# Patient Record
Sex: FEMALE | Race: BLACK OR AFRICAN AMERICAN | Employment: UNEMPLOYED | URBAN - METROPOLITAN AREA
[De-identification: names, ages, dates, MRNs, and addresses within clinical notes are randomized per-mention and may not be internally consistent; named-entity substitution may affect disease eponyms.]

---

## 2018-12-24 ENCOUNTER — APPOINTMENT (OUTPATIENT)
Dept: CT IMAGING | Age: 63
DRG: 175 | End: 2018-12-24

## 2018-12-24 ENCOUNTER — HOSPITAL ENCOUNTER (INPATIENT)
Age: 63
LOS: 4 days | Discharge: HOME | DRG: 175 | End: 2018-12-28
Attending: EMERGENCY MEDICINE | Admitting: INTERNAL MEDICINE

## 2018-12-24 DIAGNOSIS — J18.9 PNEUMONIA DUE TO ORGANISM: Primary | ICD-10-CM

## 2018-12-24 DIAGNOSIS — J96.01 ACUTE RESPIRATORY FAILURE WITH HYPOXIA (HCC): ICD-10-CM

## 2018-12-24 DIAGNOSIS — A41.9 SEVERE SEPSIS (HCC): ICD-10-CM

## 2018-12-24 DIAGNOSIS — I26.09 COR PULMONALE, ACUTE (HCC): ICD-10-CM

## 2018-12-24 DIAGNOSIS — I26.99 BILATERAL PULMONARY EMBOLISM (HCC): ICD-10-CM

## 2018-12-24 DIAGNOSIS — R65.20 SEVERE SEPSIS (HCC): ICD-10-CM

## 2018-12-24 LAB
A/G RATIO: 1 (ref 1.1–2.2)
ALBUMIN SERPL-MCNC: 4.3 G/DL (ref 3.4–5)
ALP BLD-CCNC: 75 U/L (ref 40–129)
ALT SERPL-CCNC: 15 U/L (ref 10–40)
ANION GAP SERPL CALCULATED.3IONS-SCNC: 13 MMOL/L (ref 3–16)
APTT: 33.1 SEC (ref 26–36)
AST SERPL-CCNC: 21 U/L (ref 15–37)
BASE EXCESS ARTERIAL: 0.2 MMOL/L (ref -3–3)
BASOPHILS ABSOLUTE: 0 K/UL (ref 0–0.2)
BASOPHILS RELATIVE PERCENT: 0.5 %
BILIRUB SERPL-MCNC: 0.3 MG/DL (ref 0–1)
BILIRUBIN URINE: NEGATIVE
BLOOD, URINE: ABNORMAL
BUN BLDV-MCNC: 21 MG/DL (ref 7–20)
CALCIUM SERPL-MCNC: 10.2 MG/DL (ref 8.3–10.6)
CARBOXYHEMOGLOBIN ARTERIAL: 0.6 % (ref 0–1.5)
CHLORIDE BLD-SCNC: 103 MMOL/L (ref 99–110)
CLARITY: CLEAR
CO2: 26 MMOL/L (ref 21–32)
COLOR: YELLOW
CREAT SERPL-MCNC: 1.1 MG/DL (ref 0.6–1.2)
EOSINOPHILS ABSOLUTE: 0.2 K/UL (ref 0–0.6)
EOSINOPHILS RELATIVE PERCENT: 3.4 %
EPITHELIAL CELLS, UA: 0 /HPF (ref 0–5)
GFR AFRICAN AMERICAN: >60
GFR NON-AFRICAN AMERICAN: 50
GLOBULIN: 4.2 G/DL
GLUCOSE BLD-MCNC: 131 MG/DL (ref 70–99)
GLUCOSE URINE: NEGATIVE MG/DL
HCO3 ARTERIAL: 25.9 MMOL/L (ref 21–29)
HCT VFR BLD CALC: 45.2 % (ref 36–48)
HEMOGLOBIN, ART, EXTENDED: 14.2 G/DL (ref 12–16)
HEMOGLOBIN: 14.8 G/DL (ref 12–16)
HYALINE CASTS: 0 /LPF (ref 0–8)
INR BLD: 1.07 (ref 0.86–1.14)
KETONES, URINE: NEGATIVE MG/DL
LACTIC ACID: 0.9 MMOL/L (ref 0.4–2)
LACTIC ACID: 2.5 MMOL/L (ref 0.4–2)
LEUKOCYTE ESTERASE, URINE: NEGATIVE
LYMPHOCYTES ABSOLUTE: 2.2 K/UL (ref 1–5.1)
LYMPHOCYTES RELATIVE PERCENT: 33.5 %
MCH RBC QN AUTO: 30 PG (ref 26–34)
MCHC RBC AUTO-ENTMCNC: 32.8 G/DL (ref 31–36)
MCV RBC AUTO: 91.6 FL (ref 80–100)
METHEMOGLOBIN ARTERIAL: 0.1 %
MICROSCOPIC EXAMINATION: YES
MONOCYTES ABSOLUTE: 0.3 K/UL (ref 0–1.3)
MONOCYTES RELATIVE PERCENT: 4.4 %
NEUTROPHILS ABSOLUTE: 3.7 K/UL (ref 1.7–7.7)
NEUTROPHILS RELATIVE PERCENT: 58.2 %
NITRITE, URINE: NEGATIVE
O2 CONTENT ARTERIAL: 20 ML/DL
O2 SAT, ARTERIAL: 98.5 %
O2 THERAPY: ABNORMAL
PCO2 ARTERIAL: 44.7 MMHG (ref 35–45)
PDW BLD-RTO: 14.3 % (ref 12.4–15.4)
PH ARTERIAL: 7.37 (ref 7.35–7.45)
PH UA: 5
PLATELET # BLD: 185 K/UL (ref 135–450)
PMV BLD AUTO: 9.6 FL (ref 5–10.5)
PO2 ARTERIAL: 114 MMHG (ref 75–108)
POTASSIUM REFLEX MAGNESIUM: 4.3 MMOL/L (ref 3.5–5.1)
PRO-BNP: 30 PG/ML (ref 0–124)
PROTEIN UA: NEGATIVE MG/DL
PROTHROMBIN TIME: 12.2 SEC (ref 9.8–13)
RBC # BLD: 4.93 M/UL (ref 4–5.2)
RBC UA: 1 /HPF (ref 0–4)
REASON FOR REJECTION: NORMAL
REJECTED TEST: NORMAL
SODIUM BLD-SCNC: 142 MMOL/L (ref 136–145)
SPECIFIC GRAVITY UA: 1.02
TCO2 ARTERIAL: 61 MMOL/L
TOTAL PROTEIN: 8.5 G/DL (ref 6.4–8.2)
TROPONIN: 0.12 NG/ML
URINE REFLEX TO CULTURE: ABNORMAL
URINE TYPE: ABNORMAL
UROBILINOGEN, URINE: 0.2 E.U./DL
WBC # BLD: 6.4 K/UL (ref 4–11)
WBC UA: 0 /HPF (ref 0–5)

## 2018-12-24 PROCEDURE — 81001 URINALYSIS AUTO W/SCOPE: CPT

## 2018-12-24 PROCEDURE — 83880 ASSAY OF NATRIURETIC PEPTIDE: CPT

## 2018-12-24 PROCEDURE — 6360000002 HC RX W HCPCS: Performed by: EMERGENCY MEDICINE

## 2018-12-24 PROCEDURE — 93005 ELECTROCARDIOGRAM TRACING: CPT | Performed by: EMERGENCY MEDICINE

## 2018-12-24 PROCEDURE — 94640 AIRWAY INHALATION TREATMENT: CPT

## 2018-12-24 PROCEDURE — 96375 TX/PRO/DX INJ NEW DRUG ADDON: CPT

## 2018-12-24 PROCEDURE — 85730 THROMBOPLASTIN TIME PARTIAL: CPT

## 2018-12-24 PROCEDURE — 2580000003 HC RX 258: Performed by: EMERGENCY MEDICINE

## 2018-12-24 PROCEDURE — 99285 EMERGENCY DEPT VISIT HI MDM: CPT

## 2018-12-24 PROCEDURE — 85610 PROTHROMBIN TIME: CPT

## 2018-12-24 PROCEDURE — 82803 BLOOD GASES ANY COMBINATION: CPT

## 2018-12-24 PROCEDURE — 85025 COMPLETE CBC W/AUTO DIFF WBC: CPT

## 2018-12-24 PROCEDURE — 6360000002 HC RX W HCPCS

## 2018-12-24 PROCEDURE — 36415 COLL VENOUS BLD VENIPUNCTURE: CPT

## 2018-12-24 PROCEDURE — 71260 CT THORAX DX C+: CPT

## 2018-12-24 PROCEDURE — 83605 ASSAY OF LACTIC ACID: CPT

## 2018-12-24 PROCEDURE — 94660 CPAP INITIATION&MGMT: CPT

## 2018-12-24 PROCEDURE — 6360000004 HC RX CONTRAST MEDICATION: Performed by: EMERGENCY MEDICINE

## 2018-12-24 PROCEDURE — 2580000003 HC RX 258: Performed by: INTERNAL MEDICINE

## 2018-12-24 PROCEDURE — 2500000003 HC RX 250 WO HCPCS: Performed by: INTERNAL MEDICINE

## 2018-12-24 PROCEDURE — 6370000000 HC RX 637 (ALT 250 FOR IP): Performed by: EMERGENCY MEDICINE

## 2018-12-24 PROCEDURE — 2000000000 HC ICU R&B

## 2018-12-24 PROCEDURE — 6360000002 HC RX W HCPCS: Performed by: INTERNAL MEDICINE

## 2018-12-24 PROCEDURE — 80053 COMPREHEN METABOLIC PANEL: CPT

## 2018-12-24 PROCEDURE — 87040 BLOOD CULTURE FOR BACTERIA: CPT

## 2018-12-24 PROCEDURE — 96374 THER/PROPH/DIAG INJ IV PUSH: CPT

## 2018-12-24 PROCEDURE — 84484 ASSAY OF TROPONIN QUANT: CPT

## 2018-12-24 RX ORDER — HEPARIN SODIUM 1000 [USP'U]/ML
80 INJECTION, SOLUTION INTRAVENOUS; SUBCUTANEOUS ONCE
Status: COMPLETED | OUTPATIENT
Start: 2018-12-24 | End: 2018-12-24

## 2018-12-24 RX ORDER — HEPARIN SODIUM 1000 [USP'U]/ML
INJECTION, SOLUTION INTRAVENOUS; SUBCUTANEOUS
Status: COMPLETED
Start: 2018-12-24 | End: 2018-12-24

## 2018-12-24 RX ORDER — ONDANSETRON 2 MG/ML
4 INJECTION INTRAMUSCULAR; INTRAVENOUS EVERY 6 HOURS PRN
Status: DISCONTINUED | OUTPATIENT
Start: 2018-12-24 | End: 2018-12-28 | Stop reason: HOSPADM

## 2018-12-24 RX ORDER — SODIUM CHLORIDE 0.9 % (FLUSH) 0.9 %
10 SYRINGE (ML) INJECTION EVERY 12 HOURS SCHEDULED
Status: DISCONTINUED | OUTPATIENT
Start: 2018-12-24 | End: 2018-12-28 | Stop reason: HOSPADM

## 2018-12-24 RX ORDER — IPRATROPIUM BROMIDE AND ALBUTEROL SULFATE 2.5; .5 MG/3ML; MG/3ML
1 SOLUTION RESPIRATORY (INHALATION) ONCE
Status: COMPLETED | OUTPATIENT
Start: 2018-12-24 | End: 2018-12-24

## 2018-12-24 RX ORDER — 0.9 % SODIUM CHLORIDE 0.9 %
3000 INTRAVENOUS SOLUTION INTRAVENOUS ONCE
Status: COMPLETED | OUTPATIENT
Start: 2018-12-24 | End: 2018-12-24

## 2018-12-24 RX ORDER — HEPARIN SODIUM 10000 [USP'U]/100ML
18 INJECTION, SOLUTION INTRAVENOUS CONTINUOUS
Status: DISPENSED | OUTPATIENT
Start: 2018-12-24 | End: 2018-12-27

## 2018-12-24 RX ORDER — HEPARIN SODIUM 1000 [USP'U]/ML
80 INJECTION, SOLUTION INTRAVENOUS; SUBCUTANEOUS PRN
Status: DISCONTINUED | OUTPATIENT
Start: 2018-12-24 | End: 2018-12-27

## 2018-12-24 RX ORDER — SODIUM CHLORIDE 0.9 % (FLUSH) 0.9 %
10 SYRINGE (ML) INJECTION PRN
Status: DISCONTINUED | OUTPATIENT
Start: 2018-12-24 | End: 2018-12-28 | Stop reason: HOSPADM

## 2018-12-24 RX ORDER — HEPARIN SODIUM 1000 [USP'U]/ML
40 INJECTION, SOLUTION INTRAVENOUS; SUBCUTANEOUS PRN
Status: DISCONTINUED | OUTPATIENT
Start: 2018-12-24 | End: 2018-12-27

## 2018-12-24 RX ORDER — LORAZEPAM 2 MG/ML
1 INJECTION INTRAMUSCULAR ONCE
Status: COMPLETED | OUTPATIENT
Start: 2018-12-24 | End: 2018-12-24

## 2018-12-24 RX ADMIN — SODIUM CHLORIDE 3000 ML: 9 INJECTION, SOLUTION INTRAVENOUS at 20:19

## 2018-12-24 RX ADMIN — HEPARIN SODIUM AND DEXTROSE 18 UNITS/KG/HR: 10000; 5 INJECTION INTRAVENOUS at 20:47

## 2018-12-24 RX ADMIN — CEFTRIAXONE SODIUM 1 G: 10 INJECTION, POWDER, FOR SOLUTION INTRAVENOUS at 23:10

## 2018-12-24 RX ADMIN — IOPAMIDOL 75 ML: 755 INJECTION, SOLUTION INTRAVENOUS at 19:33

## 2018-12-24 RX ADMIN — VANCOMYCIN HYDROCHLORIDE 1500 MG: 10 INJECTION, POWDER, LYOPHILIZED, FOR SOLUTION INTRAVENOUS at 21:26

## 2018-12-24 RX ADMIN — IPRATROPIUM BROMIDE AND ALBUTEROL SULFATE 1 AMPULE: .5; 3 SOLUTION RESPIRATORY (INHALATION) at 19:24

## 2018-12-24 RX ADMIN — AZITHROMYCIN MONOHYDRATE 500 MG: 500 INJECTION, POWDER, LYOPHILIZED, FOR SOLUTION INTRAVENOUS at 23:11

## 2018-12-24 RX ADMIN — HEPARIN SODIUM 7980 UNITS: 1000 INJECTION, SOLUTION INTRAVENOUS; SUBCUTANEOUS at 20:43

## 2018-12-24 RX ADMIN — CEFEPIME HYDROCHLORIDE 2 G: 2 INJECTION, POWDER, FOR SOLUTION INTRAVENOUS at 20:54

## 2018-12-24 RX ADMIN — LORAZEPAM 1 MG: 2 INJECTION INTRAMUSCULAR; INTRAVENOUS at 19:42

## 2018-12-24 RX ADMIN — Medication 10 ML: at 23:11

## 2018-12-24 RX ADMIN — HEPARIN SODIUM 7980 UNITS: 1000 INJECTION INTRAVENOUS; SUBCUTANEOUS at 20:43

## 2018-12-24 ASSESSMENT — PAIN SCALES - GENERAL: PAINLEVEL_OUTOF10: 0

## 2018-12-24 NOTE — ED PROVIDER NOTES
[facility] Emergency Department    CHIEF COMPLAINT  Chief Complaint   Patient presents with    Shortness of Breath     pt states she was at work two hours ago and had sudden onset of SOB with lightheadedness. labored breathing, states she couldnt catch her breath and started coughing yellow phlegm and felt weak. pt states her finger tips also came cyanotic. cold to the touch at this time      HISTORY OF PRESENT ILLNESS  Alejandro Dangelo is a 66 y.o. female  who presents to the ED complaining of Acute onset of shortness of breath with palpitations and chest heaviness with labored breathing about 2 hours prior to arrival.  She reports that her left lower shin to get swollen periodically but she has never been evaluated for this. She reports that she had cyanosis in her fingertips of the time her symptoms began. She has also had a cough without hemoptysis. No fevers. She also denies abdominal pain nausea vomiting or diarrhea. No history of blood clots. No recent travel. She does feel a little lightheaded. She has not passed out. No other complaints, modifying factors or associated symptoms. I have reviewed the following from the nursing documentation. History reviewed. No pertinent past medical history. History reviewed. No pertinent surgical history. History reviewed. No pertinent family history. Social History     Social History    Marital status: N/A     Spouse name: N/A    Number of children: N/A    Years of education: N/A     Occupational History    Not on file.      Social History Main Topics    Smoking status: Never Smoker    Smokeless tobacco: Never Used    Alcohol use No    Drug use: No    Sexual activity: Not on file     Other Topics Concern    Not on file     Social History Narrative    No narrative on file     Current Facility-Administered Medications   Medication Dose Route Frequency Provider Last Rate Last Dose    heparin (porcine) injection 7,980 Units 80 Units/kg Intravenous Once Jeff Wall MD        heparin (porcine) injection 7,980 Units  80 Units/kg Intravenous PRN Jeff Wall MD        heparin (porcine) injection 3,990 Units  40 Units/kg Intravenous PRN Jeff Wall MD        heparin 25,000 units in dextrose 5% 250 mL infusion  18 Units/kg/hr Intravenous Continuous Jeff Wall MD        vancomycin (VANCOCIN) 1,500 mg in dextrose 5 % 250 mL IVPB  15 mg/kg Intravenous Once Jeff Wall MD        cefepime (MAXIPIME) 2 g IVPB minibag  2 g Intravenous Once Jeff Wall MD        [facility]) 500 mg in dextrose 5 % 250 mL IVPB  500 mg Intravenous Once Jeff Wall MD        0.9 % sodium chloride IV bolus 3,000 mL  3,000 mL Intravenous Once Jeff Wall MD 6,000 mL/hr at 12/24/18 2019 3,000 mL at 12/24/18 2019     No current outpatient prescriptions on file. No Known Allergies    REVIEW OF SYSTEMS  10 systems reviewed, pertinent positives per HPI otherwise noted to be negative. PHYSICAL EXAM  BP (!) 141/98   Pulse 122   Temp 98.1 °F (36.7 °C)   Resp 24   Ht 5' 7\" (1.702 m)   Wt 220 lb (99.8 kg)   SpO2 100%   BMI 34.46 kg/m²    GENERAL APPEARANCE: Awake and alert. Cooperative. Mild distress. HENT: Normocephalic. Atraumatic. Mucous membranes are dry. NECK: Supple. EYES: PERRL. EOM's grossly intact. HEART/CHEST: Reg rhythm tachycardic. No murmurs. No chest wall tenderness. LUNGS: Respirations shallow rapid labored, mild scattered rhonchi, minimal/no wheezing, diminished throughout, no focal rales. Speaking in partial sentences. ABDOMEN: No tenderness. Soft. Non-distended. No masses. No organomegaly. No guarding or rebound. Normal bowel sounds throughout. MUSCULOSKELETAL: No extremity edema. Compartments soft. No deformity. No tenderness in the extremities. All extremities neurovascularly intact. SKIN: Warm and dry. No acute rashes.    NEUROLOGICAL: Alert and oriented. CN's 2-12 intact. No gross facial drooping. Strength 5/5, sensation intact. 2 plus DTR's in knees bilaterally. Gait normal.  PSYCHIATRIC: Normal mood and affect. LABS  I have reviewed all labs for this visit.    Results for orders placed or performed during the hospital encounter of 12/24/18   CBC auto differential   Result Value Ref Range    WBC 6.4 4.0 - 11.0 K/uL    RBC 4.93 4.00 - 5.20 M/uL    Hemoglobin 14.8 12.0 - 16.0 g/dL    Hematocrit 45.2 36.0 - 48.0 %    MCV 91.6 80.0 - 100.0 fL    MCH 30.0 26.0 - 34.0 pg    MCHC 32.8 31.0 - 36.0 g/dL    RDW 14.3 12.4 - 15.4 %    Platelets 746 263 - 009 K/uL    MPV 9.6 5.0 - 10.5 fL    Neutrophils % 58.2 %    Lymphocytes % 33.5 %    Monocytes % 4.4 %    Eosinophils % 3.4 %    Basophils % 0.5 %    Neutrophils # 3.7 1.7 - 7.7 K/uL    Lymphocytes # 2.2 1.0 - 5.1 K/uL    Monocytes # 0.3 0.0 - 1.3 K/uL    Eosinophils # 0.2 0.0 - 0.6 K/uL    Basophils # 0.0 0.0 - 0.2 K/uL   Comprehensive Metabolic Panel w/ Reflex to MG   Result Value Ref Range    Sodium 142 136 - 145 mmol/L    Potassium reflex Magnesium 4.3 3.5 - 5.1 mmol/L    Chloride 103 99 - 110 mmol/L    CO2 26 21 - 32 mmol/L    Anion Gap 13 3 - 16    Glucose 131 (H) 70 - 99 mg/dL    BUN 21 (H) 7 - 20 mg/dL    CREATININE 1.1 0.6 - 1.2 mg/dL    GFR Non-African American 50 (A) >60    GFR African American >60 >60    Calcium 10.2 8.3 - 10.6 mg/dL    Total Protein 8.5 (H) 6.4 - 8.2 g/dL    Alb 4.3 3.4 - 5.0 g/dL    Albumin/Globulin Ratio 1.0 (L) 1.1 - 2.2    Total Bilirubin 0.3 0.0 - 1.0 mg/dL    Alkaline Phosphatase 75 40 - 129 U/L    ALT 15 10 - 40 U/L    AST 21 15 - 37 U/L    Globulin 4.2 g/dL   Troponin   Result Value Ref Range    Troponin 0.12 (H) <0.01 ng/mL   Brain Natriuretic Peptide   Result Value Ref Range    Pro-BNP 30 0 - 124 pg/mL   Lactic Acid, Plasma   Result Value Ref Range    Lactic Acid 2.5 (H) 0.4 - 2.0 mmol/L   SPECIMEN REJECTION   Result Value Ref Range    Rejected Test pt     Reason for syndrome, pulmonary embolism, COPD/asthma, pneumonia, sepsis, pericardial tamponade, pneumothorax, CHF, thoracic aortic dissection, anxiety    The patient's ED workup was notable for acute bilateral pulmonary emboli with right upper lobe infiltrate, concerning for possible pneumonia although infarction is certainly on the differential as well. She does appear to have some evidence of cor pulmonale with a positive troponin although nonischemic EKG despite tachycardia. Despite her findings which are fairly significant she has a normal/slightly high blood pressure. She is mentating without any issue. She did not improve with nasal cannula oxygen but did improve with BiPAP in terms of work of breathing as well as Ativan for anxiolysis. Broad-spectrum abx were ordered based on elevated lactate with possible infiltrate. I consulted and spoke with Dr. Antonio Pro from vascular surgery about the patient's ED history, physical, workup, and course so far. Recommendations from this consultant included originally systemic tPA in addition to heparin. However after d/w hospitalist who again spoke with vascular surgery, plan was to try high dose heparin first and hold on tPA for now.     SEP-1 CORE MEASURE DATA    Classification: severe sepsis    Amount of fluids ordered: at least 30mL/kg based on entered actual weight at time of triage    Time at which sepsis was identified: 7:30pm    Broad-spectrum antibiotics chosen: vanc/cefepime/azithromycin based on sepsis order-set for a suspected source of: Pulmonary - Nosocomial    Repeat lactate level: pending    During the patient's ED course, the patient was given:  Medications   heparin (porcine) injection 7,980 Units (not administered)   heparin (porcine) injection 7,980 Units (not administered)   heparin (porcine) injection 3,990 Units (not administered)   heparin 25,000 units in dextrose 5% 250 mL infusion (not administered)   vancomycin (VANCOCIN) 1,500 mg in dextrose 5 % 250 mL IVPB (not administered)   cefepime (MAXIPIME) 2 g IVPB minibag (not administered)   azithromycin (ZITHROMAX) 500 mg in dextrose 5 % 250 mL IVPB (not administered)   0.9 % sodium chloride IV bolus 3,000 mL (3,000 mLs Intravenous New Bag 12/24/18 2019)   ipratropium-albuterol (DUONEB) nebulizer solution 1 ampule (1 ampule Inhalation Given 12/24/18 1924)   LORazepam (ATIVAN) injection 1 mg (1 mg Intravenous Given 12/24/18 1942)   iopamidol (ISOVUE-370) 76 % injection 75 mL (75 mLs Intravenous Given 12/24/18 1933)        CLINICAL IMPRESSION  1. Pneumonia due to organism    2. Severe sepsis ([address]    3. Acute respiratory failure with hypoxia (HCC)    4. Bilateral pulmonary embolism ([address]    5. Cor pulmonale, acute (HCC)        Blood pressure (!) 141/98, pulse 122, temperature 98.1 °F (36.7 °C), resp. rate 24, height 5' 7\" (1.702 m), weight 220 lb (99.8 kg), SpO2 100 %. DISPOSITION  [facility] was admitted in fair condition. I have discussed the findings of today's workup with the patient and addressed the patient's questions and concerns. The plan is to admit to the hospital at this time under the hospitalist service. I spoke with Dr. Cristal Caruso who accepted the patient and will take over the patient's care. The patient is agreeable with this plan. The total critical care time spent while evaluating and treating this patient was at least 90 minutes. This excludes time spent doing separately billable procedures. This includes time at the bedside, data interpretation, medication management, obtaining critical history from collateral sources if the patient is unable to provide it directly, and physician consultation. Specifics of interventions taken and potentially life-threatening diagnostic considerations are listed above in the medical decision making. DISCLAIMER: This chart was created using Dragon dictation software.   Efforts were made by me to ensure accuracy, however some errors may be present due to limitations of this technology and occasionally words are not transcribed correctly.        Gustavo Arnold MD  12/24/18 2040

## 2018-12-25 PROBLEM — J18.9 PNEUMONIA OF RIGHT UPPER LOBE DUE TO INFECTIOUS ORGANISM: Status: ACTIVE | Noted: 2018-12-25

## 2018-12-25 PROBLEM — J96.01 ACUTE RESPIRATORY FAILURE WITH HYPOXIA (HCC): Status: ACTIVE | Noted: 2018-12-25

## 2018-12-25 LAB
APTT: 149.9 SEC (ref 26–36)
APTT: 187 SEC (ref 26–36)
APTT: 33.4 SEC (ref 26–36)
APTT: >248 SEC (ref 26–36)
EKG ATRIAL RATE: 124 BPM
EKG DIAGNOSIS: NORMAL
EKG P AXIS: 56 DEGREES
EKG P-R INTERVAL: 148 MS
EKG Q-T INTERVAL: 294 MS
EKG QRS DURATION: 80 MS
EKG QTC CALCULATION (BAZETT): 422 MS
EKG R AXIS: 44 DEGREES
EKG T AXIS: 41 DEGREES
EKG VENTRICULAR RATE: 124 BPM
LEFT VENTRICULAR EJECTION FRACTION HIGH VALUE: 65 %
LEFT VENTRICULAR EJECTION FRACTION MODE: NORMAL
LV EF: 65 %
LVEF MODALITY: NORMAL
TROPONIN: 0.12 NG/ML
TROPONIN: 0.2 NG/ML

## 2018-12-25 PROCEDURE — 99255 IP/OBS CONSLTJ NEW/EST HI 80: CPT | Performed by: INTERNAL MEDICINE

## 2018-12-25 PROCEDURE — 6360000002 HC RX W HCPCS: Performed by: INTERNAL MEDICINE

## 2018-12-25 PROCEDURE — 85730 THROMBOPLASTIN TIME PARTIAL: CPT

## 2018-12-25 PROCEDURE — 94660 CPAP INITIATION&MGMT: CPT

## 2018-12-25 PROCEDURE — 94762 N-INVAS EAR/PLS OXIMTRY CONT: CPT

## 2018-12-25 PROCEDURE — 84484 ASSAY OF TROPONIN QUANT: CPT

## 2018-12-25 PROCEDURE — 2500000003 HC RX 250 WO HCPCS: Performed by: INTERNAL MEDICINE

## 2018-12-25 PROCEDURE — 93010 ELECTROCARDIOGRAM REPORT: CPT | Performed by: INTERNAL MEDICINE

## 2018-12-25 PROCEDURE — 99254 IP/OBS CNSLTJ NEW/EST MOD 60: CPT | Performed by: SURGERY

## 2018-12-25 PROCEDURE — 36415 COLL VENOUS BLD VENIPUNCTURE: CPT

## 2018-12-25 PROCEDURE — 93306 TTE W/DOPPLER COMPLETE: CPT

## 2018-12-25 PROCEDURE — 2700000000 HC OXYGEN THERAPY PER DAY

## 2018-12-25 PROCEDURE — 6360000002 HC RX W HCPCS: Performed by: EMERGENCY MEDICINE

## 2018-12-25 PROCEDURE — 2000000000 HC ICU R&B

## 2018-12-25 RX ORDER — AZITHROMYCIN 250 MG/1
250 TABLET, FILM COATED ORAL DAILY
Status: DISCONTINUED | OUTPATIENT
Start: 2018-12-26 | End: 2018-12-28 | Stop reason: HOSPADM

## 2018-12-25 RX ADMIN — HEPARIN SODIUM AND DEXTROSE 10 UNITS/KG/HR: 10000; 5 INJECTION INTRAVENOUS at 19:02

## 2018-12-25 RX ADMIN — HEPARIN SODIUM 7980 UNITS: 1000 INJECTION INTRAVENOUS; SUBCUTANEOUS at 19:03

## 2018-12-25 RX ADMIN — CEFTRIAXONE SODIUM 1 G: 10 INJECTION, POWDER, FOR SOLUTION INTRAVENOUS at 22:41

## 2018-12-25 ASSESSMENT — PAIN SCALES - GENERAL
PAINLEVEL_OUTOF10: 0

## 2018-12-25 NOTE — CONSULTS
Alert, cooperative, no distress, appears stated age   Head:  Normocephalic, without obvious abnormality, atraumatic   Eyes:  PERRL, conjunctiva/corneas clear       Nose: Nares normal, no drainage or sinus tenderness   Throat: Lips, mucosa, and tongue normal   Neck: Supple, symmetrical, trachea midline, no adenopathy, thyroid: not enlarged, symmetric, no tenderness/mass/nodules, no carotid bruit or JVD       Lungs:   Clear to auscultation bilaterally, respirations unlabored   Chest Wall:  No tenderness or deformity   Heart:  Regular rate and rhythm, S1, S2 normal, no murmur, rub or gallop   Abdomen:   Soft, non-tender, bowel sounds active all four quadrants,  no masses, no organomegaly           Extremities: Extremities normal, atraumatic, no cyanosis, left leg 2 + nonpitting edema   Pulses: 2+ and symmetric   Skin: Skin color, texture, turgor normal, no rashes or lesions   Pysch: Normal mood and affect   Neurologic: Normal gross motor and sensory exam.         Labs  CBC:   Lab Results   Component Value Date    WBC 6.4 12/24/2018    RBC 4.93 12/24/2018    HGB 14.8 12/24/2018    HCT 45.2 12/24/2018    MCV 91.6 12/24/2018    RDW 14.3 12/24/2018     12/24/2018     CMP:    Lab Results   Component Value Date     12/24/2018    K 4.3 12/24/2018     12/24/2018    CO2 26 12/24/2018    BUN 21 12/24/2018    CREATININE 1.1 12/24/2018    GFRAA >60 12/24/2018    AGRATIO 1.0 12/24/2018    LABGLOM 50 12/24/2018    GLUCOSE 131 12/24/2018    PROT 8.5 12/24/2018    CALCIUM 10.2 12/24/2018    BILITOT 0.3 12/24/2018    ALKPHOS 75 12/24/2018    AST 21 12/24/2018    ALT 15 12/24/2018     PT/INR:  No results found for: PTINR  Lab Results   Component Value Date    TROPONINI 0.12 (H) 12/25/2018     Trop 0.20 -> 0.12  Echo preliminary Right ventricle is not enlarged. CT chest is not the best test to assess RV size.  It is the echo or MRI  RV function on initial eval appears ok but will put final report as soon images are down

## 2018-12-25 NOTE — H&P
[facility]                     [facility], [address]                              HISTORY AND PHYSICAL    PATIENT NAME: Destinee Herbert                       :        1955  MED REC NO:   [id number]                          ROOM:       6684  ACCOUNT NO:   [de-identified]                           ADMIT DATE: 2018  PROVIDER:     Blaire Shaw MD    I obtained the history and performed the physical exam on the patient on  the medical floor in the emergency room on 2018. CHIEF COMPLAINT:  Shortness of breath. HISTORY OF PRESENT ILLNESS:  The patient is a 72-year-old  -American female, presented to the hospital with chief complaints  of one-day history of sudden onset of severe shortness of breath along  with extreme lightheadedness, difficulty in breathing, coughing, and  weakness that caused her to have to come to the emergency room. Upon  the ER documentation, the patient also was noted to have bluish  discoloration of her fingertips. PAST MEDICAL/PAST SURGICAL HISTORY:  No major medical problems in the  past.    ALLERGIC HISTORY:  No known allergies. FAMILY HISTORY:  Reviewed by me and is currently noncontributory. SOCIAL HISTORY:  Nonsmoker. No illicit substance use. MEDICATIONS:  The patient's home medication list has been reviewed. REVIEW OF SYSTEMS:  The patient's review of systems is significant for  the sudden onset of shortness of breath and the dizziness, and per the  history of present illness. All other systems have been reviewed and  are negative except for the present illness. PHYSICAL EXAMINATION:  GENERAL:  The patient is examined by me in the emergency room. VITAL SIGNS:  Temperature 98.1, respiratory rate is 18, pulse initially  128, blood pressure 141/98, saturating initially 76% on room air,  improved to 97% on 40% FiO2. CNS:  Alert, awake, and oriented to time, place, and person.   PSYCH: Cooperative, pleasant, answering questions appropriately. Does  appear significantly anxious. EYES:  Pupils are reactive to light. ENT:  Extraocular muscle movements are intact. The patient does have  significant nasal flaring. RESPIRATORY SYSTEM:  No rales, rubs, or rhonchi. CVS:  S1 and S2 are heard. No murmurs or rubs. ABDOMEN:  Soft. No guarding, rigidity, or rebound. MUSCULOSKELETAL:  No acute deformities. The patient has got left lower  extremity significantly is more swollen than the right with pitting  edema. SKIN:  No rashes or lesions. DIAGNOSTIC DATA:  CT chest, PE protocol, independently reviewed by me  does show significant pulmonary emboli specially on the right side, what  appears to be pneumonia per the radiologist actually appears to be more  a lung infarction because of the significant pulmonary embolism. The radiologist has interpreted the RV to be mildly dilated. ABG shows a pH of 7.37, pCO2 of 44.7. Comprehensive metabolic panel  showed glucose 131, BUN 29, and creatinine 1.1. Troponin 0.12, lactic  acid 2.5. CBC showed white count of 6.4.    CONSULTATIONS:  I discussed the case independently with the vascular  surgeon over the phone and I expressed to her my concern with respect to  the initial plan of giving TPA given the lack of hemodynamic instability  at this point in time, and she recommended continuing high dose IV  heparin instead which I agree with. ASSESSMENT:  1. Acute bilateral pulmonary embolisms with lung infarction, suggestive  of a submassive pulmonary embolism as evidenced by RV dysfunction and  slight myocardial strain/necrosis. 2.  Obesity with a BMI of 36.4 kg/m2. PLAN OF CARE:  The patient has been admitted to the Internal Medicine  Service. She is currently critically ill given the large size of the  pulmonary embolism.   At this point in time, we are avoiding giving her  TPA given the fact that the patient's blood pressure is stable and

## 2018-12-25 NOTE — PROGRESS NOTES
H/p dictation id [id number]. Date of service 12/24/2018. Submassive PE with lung infarction. RV strain. Obesity.

## 2018-12-25 NOTE — PROGRESS NOTES
Received call from lab results of APTT which was drawn at 0810 in error. Heparin drip had been off per protocol and timing of lab test changed. Most recent APTT which was drawn at 1031 currently running and will call those results.

## 2018-12-25 NOTE — PROGRESS NOTES
Dr Mariel Hallman here for rounds. Reviewed chart, examined and discussed patient. Will continue with heparin drip. Would like echocardiogram and doppler studies of lower legs today. Call placed to echo tech & vascular tech who are both on call. Call returned and spoke to Dr Mariel Hallman who verified that he wanted the echo done today however the doppler could wait until tomorrow. Tech informed us that she would need cardiology consult and the cardiologist would need to call her in. Order placed.

## 2018-12-25 NOTE — PLAN OF CARE
Problem: Pain:  Goal: Pain level will decrease  Pain level will decrease   Outcome: Ongoing    Goal: Control of acute pain  Control of acute pain   Outcome: Ongoing      Problem: Airway Clearance - Ineffective:   Intervention: Assess risk for ineffective airway clearance  BiPAP use ongoing and effective

## 2018-12-25 NOTE — PROGRESS NOTES
Patient awakened for assessment. Arouses easily to name. Alert, oriented and cooperative. On BiPap 12/6 40%. Tolerating well. O2 sat 97%. Respirations non labored at rest.  Chest sounds clear bilaterally with equal air exchange. Denies pain or SOB at this time. Abdomen soft, non tender with BS present x 4. Peripheral pulses present bilaterally. Edema noted both lower extremities with markedly increased edema in left leg. States the swelling is chronic and intermittently has discomfort in the leg. Complete assessment done and recorded. See flow sheet for details.

## 2018-12-25 NOTE — PROGRESS NOTES
last APTT >248. held the heparin gtt per protocol for 1hr and just restarted it at 12u/kg/hr. Next aPTT due at 1005. Noted the troponin trended up from 0.12 to 0.20. Made MD on call aware of both labs. No new orders received. VSS. Patient continues to tolerate BiPAP. O2 sat weaned down from 60% to 40% at this time.  Will continue to monitor

## 2018-12-25 NOTE — PROGRESS NOTES
Bedside report received from off going shift to ensure safe transfer of care. Patient awake without complaints. Will return for complete assessment.

## 2018-12-25 NOTE — PROGRESS NOTES
Patient was transferred from ER to room [id number]. Was on non re breather when arrived and with O2 sat of 87%. Patient was placed on BiPAP and O2 sat went up to 93%. Patient was settled down in the room and oriented to the room and call light use. Lungs with slight rhonchi. BLE +1 edema but noted L>R. Denied pain. Has heparin gtt infusing at 18u/kg/hr and antibiotics infusing also. Bowers draining clear yellow urine. Will continue to monitor.

## 2018-12-25 NOTE — CONSULTS
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 She thinks that she might have been somewhat short of breath for the last few days but never considered it to be a significant issue. No cough, phlegm or chest pain. No fevers. No travel history. Left leg has been swollen for \"years\". No family history or prior history of VTE. Not on hormone replacement therapy. No history of malignancies, never had colonoscopy in the past.       The patient is a 66 y.o. female with significant past medical history of:      Diagnosis Date    Hypertension         Past Surgical History:        Procedure Laterality Date    PATELLA SURGERY Right     TUBAL LIGATION       Current Medications:    Current Facility-Administered Medications: heparin (porcine) injection 7,980 Units, 80 Units/kg, Intravenous, PRN  heparin (porcine) injection 3,990 Units, 40 Units/kg, Intravenous, PRN  heparin 25,000 units in dextrose 5% 250 mL infusion, 18 Units/kg/hr, Intravenous, Continuous  sodium chloride flush 0.9 % injection 10 mL, 10 mL, Intravenous, 2 times per day  sodium chloride flush 0.9 % injection 10 mL, 10 mL, Intravenous, PRN  ondansetron (ZOFRAN) injection 4 mg, 4 mg, Intravenous, Q6H PRN  cefTRIAXone (ROCEPHIN) 1 g in sterile water 10 mL IV syringe, 1 g, Intravenous, Q24H  azithromycin (ZITHROMAX) 500 mg in dextrose 5 % 250 mL IVPB, 500 mg, Intravenous, Q24H    No Known Allergies    Social History:    TOBACCO:   reports that she quit smoking about 5 years ago. She has a 60.00 pack-year smoking history. She has never used smokeless tobacco.  ETOH:   reports that she does not drink alcohol.   Patient currently lives independently    Family History:   Family History   Problem Relation Age of Onset    Heart Disease Father     Heart Disease Paternal Aunt     Heart Disease Paternal Uncle     Diabetes Paternal Grandmother        REVIEW OF SYSTEMS:    Constitutional: negative for fatigue, fevers, malaise and weight loss  Ears, nose, mouth, throat: negative for ear drainage, epistaxis, hoarseness, nasal congestion, sore throat and voice change  Respiratory: negative except for shortness of breath  Cardiovascular: negative for chest pain, chest pressure/discomfort, irregular heart beat, lower extremity edema and palpitations  Gastrointestinal: negative for abdominal pain, constipation, diarrhea, jaundice, melena, odynophagia, reflux symptoms and vomiting  Hematologic/lymphatic: negative for bleeding, easy bruising, lymphadenopathy and petechiae  Musculoskeletal:negative for arthralgias, bone pain, muscle weakness, neck pain and stiff joints  Neurological: negative for dizziness, gait problems, headaches, seizures, speech problems, tremors and weakness  Behavioral/Psych: negative for anxiety, behavior problems, depression, fatigue and sleep disturbance  Endocrine: negative for diabetic symptoms including none, neuropathy, polyphagia, polyuria, polydipsia, vomiting and diarrhea and temperature intolerance  Allergic/Immunologic: negative for anaphylaxis, angioedema, hay fever and urticaria      Objective:   Patient Vitals for the past 8 hrs:   BP Temp Temp src Pulse Resp SpO2 Weight   12/25/18 0800 - - Temporal - - - -   12/25/18 0730 - - - - 18 97 % -   12/25/18 0600 138/76 - - 89 18 96 % -   12/25/18 0414 - - - - 19 97 % -   12/25/18 0400 132/83 98.7 °F (37.1 °C) Temporal 95 22 97 % 231 lb 0.7 oz (104.8 kg)   12/25/18 0300 138/81 - - 91 - 98 % -   12/25/18 0200 (!) 129/95 - - 95 20 98 % -   12/25/18 0100 128/81 - - 99 22 94 % -     I/O last 3 completed shifts:   In: 4131 [I.V.:2495; IV Piggyback:250]  Out: 2650 [Urine:2650]  No intake/output data recorded. Physical Exam:  General Appearance: alert and oriented to person, place and time, well developed and well- nourished, in no acute distress  Skin: warm and dry, no rash or erythema  Head: normocephalic and atraumatic  Eyes: pupils equal, round, and reactive to light, extraocular eye movements intact, conjunctivae normal  ENT: external ear and ear canal normal bilaterally, nose without deformity, nasal mucosa and turbinates normal  Neck: supple and non-tender without mass, no cervical lymphadenopathy  Pulmonary/Chest: clear to auscultation bilaterally- no wheezes, rales or rhonchi, normal air movement, no respiratory distress  Cardiovascular: normal rate, regular rhythm,  no murmurs, rubs, distal pulses intact, no carotid bruits  Abdomen: soft, non-tender, non-distended, normal bowel sounds, no masses or organomegaly  Lymph Nodes: Cervical, supraclavicular normal  Extremities: no cyanosis, clubbing or edema  Musculoskeletal: normal range of motion, no joint swelling, deformity or tenderness  Neurologic: alert, no focal neurologic deficits    Data Review:  CBC: Lab Results   Component Value Date    WBC 6.4 12/24/2018    RBC 4.93 12/24/2018     BMP: Lab Results   Component Value Date    GLUCOSE 131 12/24/2018    CO2 26 12/24/2018    BUN 21 12/24/2018    CREATININE 1.1 12/24/2018    CALCIUM 10.2 12/24/2018     ABG: Lab Results   Component Value Date    QGK5JPL 25.9 12/24/2018    BEART 0.2 12/24/2018    P5KLKXIU 98.5 12/24/2018    PHART 7.371 12/24/2018    JQJ6XOV 44.7 12/24/2018    PO2ART 114.0 12/24/2018    HDX0OTD 61.0 12/24/2018       Radiology: All pertinent images / reports were reviewed as a part of this visit. Problem List:   Bilateral PE with acute cor pulmonale  Acute hypoxic respiratory failure  Pulmonary infarct    Assessment/Plan:     Reviewed patient's CT scan which shows significant clot burden bilaterally with RV dilation. Suspect acute cor pulmonale.   Will await for

## 2018-12-26 LAB
ANION GAP SERPL CALCULATED.3IONS-SCNC: 10 MMOL/L (ref 3–16)
APTT: 160.7 SEC (ref 26–36)
APTT: 36.8 SEC (ref 26–36)
APTT: 47.8 SEC (ref 26–36)
APTT: 77.4 SEC (ref 26–36)
BUN BLDV-MCNC: 12 MG/DL (ref 7–20)
CALCIUM SERPL-MCNC: 9.1 MG/DL (ref 8.3–10.6)
CHLORIDE BLD-SCNC: 107 MMOL/L (ref 99–110)
CO2: 24 MMOL/L (ref 21–32)
CREAT SERPL-MCNC: 0.8 MG/DL (ref 0.6–1.2)
GFR AFRICAN AMERICAN: >60
GFR NON-AFRICAN AMERICAN: >60
GLUCOSE BLD-MCNC: 102 MG/DL (ref 70–99)
HCT VFR BLD CALC: 39.2 % (ref 36–48)
HEMOGLOBIN: 12.9 G/DL (ref 12–16)
INR BLD: 1.22 (ref 0.86–1.14)
MCH RBC QN AUTO: 30 PG (ref 26–34)
MCHC RBC AUTO-ENTMCNC: 33 G/DL (ref 31–36)
MCV RBC AUTO: 91 FL (ref 80–100)
PDW BLD-RTO: 14 % (ref 12.4–15.4)
PLATELET # BLD: 143 K/UL (ref 135–450)
PMV BLD AUTO: 10.5 FL (ref 5–10.5)
POTASSIUM SERPL-SCNC: 3.6 MMOL/L (ref 3.5–5.1)
PROTHROMBIN TIME: 13.9 SEC (ref 9.8–13)
RBC # BLD: 4.31 M/UL (ref 4–5.2)
SODIUM BLD-SCNC: 141 MMOL/L (ref 136–145)
WBC # BLD: 6 K/UL (ref 4–11)

## 2018-12-26 PROCEDURE — 36415 COLL VENOUS BLD VENIPUNCTURE: CPT

## 2018-12-26 PROCEDURE — 6360000002 HC RX W HCPCS: Performed by: EMERGENCY MEDICINE

## 2018-12-26 PROCEDURE — 2580000003 HC RX 258: Performed by: INTERNAL MEDICINE

## 2018-12-26 PROCEDURE — 6360000002 HC RX W HCPCS: Performed by: INTERNAL MEDICINE

## 2018-12-26 PROCEDURE — 85610 PROTHROMBIN TIME: CPT

## 2018-12-26 PROCEDURE — 1200000000 HC SEMI PRIVATE

## 2018-12-26 PROCEDURE — 80048 BASIC METABOLIC PNL TOTAL CA: CPT

## 2018-12-26 PROCEDURE — 99233 SBSQ HOSP IP/OBS HIGH 50: CPT | Performed by: INTERNAL MEDICINE

## 2018-12-26 PROCEDURE — 93970 EXTREMITY STUDY: CPT

## 2018-12-26 PROCEDURE — 6370000000 HC RX 637 (ALT 250 FOR IP): Performed by: INTERNAL MEDICINE

## 2018-12-26 PROCEDURE — 85730 THROMBOPLASTIN TIME PARTIAL: CPT

## 2018-12-26 PROCEDURE — 2500000003 HC RX 250 WO HCPCS: Performed by: INTERNAL MEDICINE

## 2018-12-26 PROCEDURE — 85027 COMPLETE CBC AUTOMATED: CPT

## 2018-12-26 RX ADMIN — HEPARIN SODIUM 3990 UNITS: 1000 INJECTION INTRAVENOUS; SUBCUTANEOUS at 16:29

## 2018-12-26 RX ADMIN — AZITHROMYCIN 250 MG: 250 TABLET, FILM COATED ORAL at 08:44

## 2018-12-26 RX ADMIN — Medication 10 ML: at 20:03

## 2018-12-26 RX ADMIN — Medication 10 ML: at 08:44

## 2018-12-26 RX ADMIN — HEPARIN SODIUM 3990 UNITS: 1000 INJECTION INTRAVENOUS; SUBCUTANEOUS at 10:03

## 2018-12-26 RX ADMIN — CEFTRIAXONE SODIUM 1 G: 10 INJECTION, POWDER, FOR SOLUTION INTRAVENOUS at 23:24

## 2018-12-26 ASSESSMENT — PAIN SCALES - GENERAL
PAINLEVEL_OUTOF10: 0

## 2018-12-26 NOTE — PLAN OF CARE
Problem: Pain:  Goal: Pain level will decrease  Pain level will decrease   Outcome: Ongoing  Patient denied pain since the start of shift till current time. Problem: Falls - Risk of:  Goal: Will remain free from falls  Will remain free from falls   Outcome: Ongoing  Patient continues on bed rest. Bed alarm in place. Patient has call button close to call for assistance.

## 2018-12-26 NOTE — PROGRESS NOTES
This note also relates to the following rows which could not be included:  Resp - Cannot attach notes to unvalidated device data  SpO2 - Cannot attach notes to unvalidated device data       12/25/18 1910   Oxygen Therapy/Pulse Ox   O2 Therapy Oxygen   O2 Device Nasal cannula  (bipap st-by, bilat.  PE)   O2 Flow Rate (L/min) 4 L/min  (decreased to 3 lpm )   Pulse Oximeter Device Mode Continuous   Pulse Oximeter Device Location Finger

## 2018-12-26 NOTE — PROGRESS NOTES
Shift assessment completed, see flow sheet. Morning medications passes see MAR. A&O X4. Lung sounds are clear in the upper lobes and diminished in the lower lobes. Bowel sounds are active and pt did orders breakfast. Pt does have some swelling in the BLE. Pt has some weakness and pain in the RLE that she states has been going on for about 6 months. Pedal pulses are palpable.  at bedside and updated. Pt to up and out of bed. Pt denies pain any other needs at this time. Will continue to monitor.

## 2018-12-26 NOTE — PROGRESS NOTES
Hospitalist Progress Note      PCP: No primary care provider on file. Date of Admission: 12/24/2018    LOS: 2    Chief Complaint:   Chief Complaint   Patient presents with    Shortness of Breath     pt states she was at work two hours ago and had sudden onset of SOB with lightheadedness. labored breathing, states she couldnt catch her breath and started coughing yellow phlegm and felt weak. pt states her finger tips also came cyanotic. cold to the touch at this time       Case Summary:   63yo male with history of hypertension who presented with acute SOB, dry cough and chills and found to have extensive bilateral pulmonary emboli with mild right ventricular strain complicated by right upper lobe pneumonia       Active Hospital Problems    Diagnosis Date Noted    Pneumonia due to organism [J18.9] 12/25/2018    Acute respiratory failure with hypoxia ([address] [J96.01] 12/25/2018    Bilateral pulmonary embolism (HCC) [I26.99]     Pulmonary embolism and infarction ([address] [I26.99] 12/24/2018           Medications:  Reviewed  Infusion Medications    heparin (porcine) 6 Units/kg/hr (12/26/18 0958)     Scheduled Medications    azithromycin  250 mg Oral Daily    sodium chloride flush  10 mL Intravenous 2 times per day    cefTRIAXone (ROCEPHIN) IV  1 g Intravenous Q24H     PRN Meds: heparin (porcine), heparin (porcine), sodium chloride flush, ondansetron        DVT Prophylaxis: Therapeutic heparin drip   Diet: DIET GENERAL;  Code Status: Full Code    Dispo:  Anticipate discharge in the next 72-96hrs    ____________________________________________________________________________    Subjective:       Pt is sitting in bed she denies any CP or SOB ( at rest) or light headedness        Physical Exam Performed:  BP (!) 164/84   Pulse 87   Temp 99 °F (37.2 °C) (Temporal)   Resp 19   Ht 5' 7\" (1.702 m)   Wt 231 lb 7.7 oz (105 kg)   LMP  (LMP Unknown)   SpO2 93%   BMI 36.26 kg/m²   General appearance: No apparent emboli as detailed above. Mildly dilated   right ventricle compared with the left suggesting right heart strain. 2. Large right upper lobe consolidation compatible with pneumonia. Findings were discussed with Dr. Fabiano Baxter of the [facility] emergency department at 7:52 pm on 12/24/2018. VL Extremity Venous Bilateral    (Results Pending)       Assessment/Plan:  Principal Problem:    Pulmonary embolism and infarction ([address] hemodynamically stable now,  it looks unprovoked, no Hx of cancer, no prolonged sitting or travel no previous hx of PE/DVT, no recent surgery, active life style, no expressed risk of bleeding  -Continue anticoagulation with heparin, ? NOACs  -cardiology saw the pt, they recommend stress test as outpt when test is stable  -ECHO done, and no strain mentioned in report  - vascular evaluated the pt, no indication for EKOS, vascular signed off  - LE venous doppler is ordered   - would recommend [facility] [place] for 6 months and then to re-evaluation as outpt for need of indefinite AC      Acute respiratory failure with hypoxia: In the setting of extensive PE and pneumonia. Improved on O2 nasal cannula 3 L.   And this has been titrated down since presentation.  -Continue O2 supplementation and wean it as tolerated with target saturations more than 92%      Pneumonia of right upper lobe due to infectious organism (HCC)  -Continue antibiotics  -Continue O2 supplementation  - pulm is following appreciate recs        Diet: DIET GENERAL;  Code:Full Code  DVT PPX heparin      Tessa Pereyra MD   12/26/2018 10:21 AM

## 2018-12-26 NOTE — PROGRESS NOTES
End of shift report completed. Patient was assessed. See assessment flowsheet. Alert and oriented x4. Patient is now oxygen per nasal canula at 3LPM and tolerating it well. Lungs clear but a bit diminished at the bases. Noted patient appeared SOB when talking repeatedly and was also validated by patient. Patient voiced having SOB on exertion. No acute distress noted. Denied pain. VSS. Bowers draining yellow urine. No acute distress noted. Heparin infusing at 1u/kg/hr. Next aPTT lab draw due at 1am. Will continue to monitor.

## 2018-12-27 LAB
APTT: 43.3 SEC (ref 26–36)
CHOLESTEROL, TOTAL: 179 MG/DL (ref 0–199)
HDLC SERPL-MCNC: 42 MG/DL (ref 40–60)
LDL CHOLESTEROL CALCULATED: 122 MG/DL
TRIGL SERPL-MCNC: 74 MG/DL (ref 0–150)
VLDLC SERPL CALC-MCNC: 15 MG/DL

## 2018-12-27 PROCEDURE — 85730 THROMBOPLASTIN TIME PARTIAL: CPT

## 2018-12-27 PROCEDURE — 99233 SBSQ HOSP IP/OBS HIGH 50: CPT | Performed by: INTERNAL MEDICINE

## 2018-12-27 PROCEDURE — 6370000000 HC RX 637 (ALT 250 FOR IP): Performed by: INTERNAL MEDICINE

## 2018-12-27 PROCEDURE — 2580000003 HC RX 258: Performed by: INTERNAL MEDICINE

## 2018-12-27 PROCEDURE — 6360000002 HC RX W HCPCS: Performed by: EMERGENCY MEDICINE

## 2018-12-27 PROCEDURE — 80061 LIPID PANEL: CPT

## 2018-12-27 PROCEDURE — 2500000003 HC RX 250 WO HCPCS: Performed by: INTERNAL MEDICINE

## 2018-12-27 PROCEDURE — 2700000000 HC OXYGEN THERAPY PER DAY

## 2018-12-27 PROCEDURE — 1200000000 HC SEMI PRIVATE

## 2018-12-27 PROCEDURE — 6360000002 HC RX W HCPCS: Performed by: INTERNAL MEDICINE

## 2018-12-27 PROCEDURE — 36415 COLL VENOUS BLD VENIPUNCTURE: CPT

## 2018-12-27 PROCEDURE — 99232 SBSQ HOSP IP/OBS MODERATE 35: CPT | Performed by: INTERNAL MEDICINE

## 2018-12-27 RX ORDER — LOSARTAN POTASSIUM 25 MG/1
50 TABLET ORAL DAILY
Status: DISCONTINUED | OUTPATIENT
Start: 2018-12-27 | End: 2018-12-28 | Stop reason: HOSPADM

## 2018-12-27 RX ORDER — IBUPROFEN 400 MG/1
400 TABLET ORAL
Status: COMPLETED | OUTPATIENT
Start: 2018-12-27 | End: 2018-12-27

## 2018-12-27 RX ADMIN — LOSARTAN POTASSIUM 50 MG: 25 TABLET ORAL at 09:02

## 2018-12-27 RX ADMIN — Medication 10 ML: at 09:02

## 2018-12-27 RX ADMIN — RIVAROXABAN 15 MG: 15 TABLET, FILM COATED ORAL at 17:12

## 2018-12-27 RX ADMIN — CEFTRIAXONE SODIUM 1 G: 10 INJECTION, POWDER, FOR SOLUTION INTRAVENOUS at 23:30

## 2018-12-27 RX ADMIN — RIVAROXABAN 15 MG: 15 TABLET, FILM COATED ORAL at 09:02

## 2018-12-27 RX ADMIN — AZITHROMYCIN 250 MG: 250 TABLET, FILM COATED ORAL at 09:02

## 2018-12-27 RX ADMIN — HEPARIN SODIUM 3990 UNITS: 1000 INJECTION INTRAVENOUS; SUBCUTANEOUS at 03:49

## 2018-12-27 RX ADMIN — Medication 10 ML: at 20:12

## 2018-12-27 RX ADMIN — IBUPROFEN 400 MG: 400 TABLET, FILM COATED ORAL at 13:03

## 2018-12-27 ASSESSMENT — PAIN DESCRIPTION - DESCRIPTORS
DESCRIPTORS: ACHING;SORE

## 2018-12-27 ASSESSMENT — PAIN DESCRIPTION - PAIN TYPE
TYPE: CHRONIC PAIN

## 2018-12-27 ASSESSMENT — PAIN DESCRIPTION - LOCATION
LOCATION: LEG;KNEE

## 2018-12-27 ASSESSMENT — PAIN SCALES - GENERAL
PAINLEVEL_OUTOF10: 0
PAINLEVEL_OUTOF10: 4
PAINLEVEL_OUTOF10: 4
PAINLEVEL_OUTOF10: 6
PAINLEVEL_OUTOF10: 3

## 2018-12-27 ASSESSMENT — PAIN DESCRIPTION - ORIENTATION
ORIENTATION: LEFT;RIGHT

## 2018-12-27 NOTE — PROGRESS NOTES
P Pulmonary and Critical Care    Follow Up Note    Subjective:   CHIEF COMPLAINT / HPI:   Chief Complaint   Patient presents with    Shortness of Breath     pt states she was at work two hours ago and had sudden onset of SOB with lightheadedness. labored breathing, states she couldnt catch her breath and started coughing yellow phlegm and felt weak. pt states her finger tips also came cyanotic. cold to the touch at this time     The patient looks and feels better. She did require 1 liter O2 supplement overnight. Back on RA this AM. She has issues with access to medication. Dr Danna Baez is starting her on some BP medication and checking cholesterol. Past Medical History:    Reviewed; no changes    Social History:    Reviewed; no changes    REVIEW OF SYSTEMS:    CONSTITUTIONAL:  negative for fevers and chills  RESPIRATORY:  See HPI  CARDIOVASCULAR:  negative for chest pain, palpitations, edema  GASTROINTESTINAL:  negative for nausea, vomiting, diarrhea, constipation and abdominal pain    Objective:   PHYSICAL EXAM:        VITALS:  BP (!) (P) 131/58   Pulse (P) 83   Temp (P) 99 °F (37.2 °C) (Temporal)   Resp (P) 19   Ht 5' 7\" (1.702 m)   Wt 228 lb 9.9 oz (103.7 kg)   LMP  (LMP Unknown)   SpO2 98%   BMI 35.81 kg/m²  on 3L NC    24HR INTAKE/OUTPUT:      Intake/Output Summary (Last 24 hours) at 12/27/18 0826  Last data filed at 12/27/18 0547   Gross per 24 hour   Intake              141 ml   Output             1450 ml   Net            -1309 ml       CONSTITUTIONAL:  awake, alert,  no apparent distress, and appears stated age  LUNGS:  No increased work of breathing and clear to auscultation, no crackles or wheezes  CARDIOVASCULAR: S1 and S2 and no JVD  ABDOMEN:  normal bowel sounds, non-distended and non-tender to palpation  EXT: No edema, no calf tenderness. Pulses are present bilaterally. NEUROLOGIC:  Mental Status Exam:  Level of Alertness:   awake  Orientation:   person, place, time.  Non focal  SKIN:

## 2018-12-27 NOTE — PROGRESS NOTES
Patient received resting in bed with eyes closed. Easily awakens. VSS. Pt denies chest pain or shortness of breath. 02 removed at 1L, sats 95% on RA. Diminished to bilateral bases, encouraged to use IS. Plan of care reviewed. Pt alert and oriented. Heparin gtt infusing. Assessment completed, see flowsheet. Bilateral legs slightly swollen, right leg warmer than left. Pt c/o pain to bilateral upper legs, knees. Pt applied icy hot cream. Rounds with , aware of cream. Will monitor patient today, ambulate on RA. Pt verbalizes understanding.

## 2018-12-27 NOTE — PROGRESS NOTES
Hospitalist Progress Note      PCP: No primary care provider on file. Date of Admission: 12/24/2018    LOS: 3    Chief Complaint:   Chief Complaint   Patient presents with    Shortness of Breath     pt states she was at work two hours ago and had sudden onset of SOB with lightheadedness. labored breathing, states she couldnt catch her breath and started coughing yellow phlegm and felt weak. pt states her finger tips also came cyanotic. cold to the touch at this time       Case Summary:   65yo male with history of hypertension who presented with acute SOB, dry cough and chills and found to have extensive bilateral pulmonary emboli with mild right ventricular strain complicated by right upper lobe pneumonia       Active Hospital Problems    Diagnosis Date Noted    Pneumonia due to organism [J18.9] 12/25/2018    Acute respiratory failure with hypoxia ([address] [J96.01] 12/25/2018    Bilateral pulmonary embolism (HCC) [I26.99]     Pulmonary embolism and infarction ([address] [I26.99] 12/24/2018           Medications:  Reviewed  Infusion Medications     Scheduled Medications    losartan  50 mg Oral Daily    rivaroxaban  15 mg Oral BID WC    azithromycin  250 mg Oral Daily    sodium chloride flush  10 mL Intravenous 2 times per day    cefTRIAXone (ROCEPHIN) IV  1 g Intravenous Q24H     PRN Meds: sodium chloride flush, ondansetron        DVT Prophylaxis: Therapeutic heparin drip   Diet: DIET GENERAL;  Code Status: Full Code    Dispo:  Anticipate discharge in the next 72-96hrs    ____________________________________________________________________________    Subjective:       Pt is sitting in bed she denies any CP or SOB ( at rest) or light headedness        Physical Exam Performed:  /71   Pulse 83   Temp 97.7 °F (36.5 °C) (Temporal)   Resp 19   Ht 5' 7\" (1.702 m)   Wt 228 lb 9.9 oz (103.7 kg)   LMP  (LMP Unknown)   SpO2 95%   BMI 35.81 kg/m²   General appearance: No apparent distress, appears stated

## 2018-12-28 VITALS
RESPIRATION RATE: 20 BRPM | OXYGEN SATURATION: 100 % | TEMPERATURE: 96.9 F | DIASTOLIC BLOOD PRESSURE: 70 MMHG | WEIGHT: 231.04 LBS | BODY MASS INDEX: 36.26 KG/M2 | SYSTOLIC BLOOD PRESSURE: 148 MMHG | HEIGHT: 67 IN | HEART RATE: 83 BPM

## 2018-12-28 PROCEDURE — 6370000000 HC RX 637 (ALT 250 FOR IP): Performed by: INTERNAL MEDICINE

## 2018-12-28 PROCEDURE — 99233 SBSQ HOSP IP/OBS HIGH 50: CPT | Performed by: INTERNAL MEDICINE

## 2018-12-28 PROCEDURE — 2580000003 HC RX 258: Performed by: INTERNAL MEDICINE

## 2018-12-28 PROCEDURE — 99231 SBSQ HOSP IP/OBS SF/LOW 25: CPT | Performed by: INTERNAL MEDICINE

## 2018-12-28 RX ORDER — LOSARTAN POTASSIUM 50 MG/1
50 TABLET ORAL DAILY
Qty: 30 TABLET | Refills: 3 | Status: SHIPPED | OUTPATIENT
Start: 2018-12-29 | End: 2020-01-09 | Stop reason: ALTCHOICE

## 2018-12-28 RX ORDER — GREEN TEA/HOODIA GORDONII 315-12.5MG
1 CAPSULE ORAL DAILY
Qty: 7 TABLET | Refills: 0 | Status: SHIPPED | OUTPATIENT
Start: 2018-12-28 | End: 2019-01-04

## 2018-12-28 RX ORDER — AZITHROMYCIN 250 MG/1
250 TABLET, FILM COATED ORAL DAILY
Qty: 6 TABLET | Refills: 0 | Status: SHIPPED | OUTPATIENT
Start: 2018-12-29 | End: 2019-01-04

## 2018-12-28 RX ADMIN — Medication 10 ML: at 09:27

## 2018-12-28 RX ADMIN — RIVAROXABAN 15 MG: 15 TABLET, FILM COATED ORAL at 17:18

## 2018-12-28 RX ADMIN — LOSARTAN POTASSIUM 50 MG: 25 TABLET ORAL at 09:27

## 2018-12-28 RX ADMIN — RIVAROXABAN 15 MG: 15 TABLET, FILM COATED ORAL at 09:27

## 2018-12-28 RX ADMIN — AZITHROMYCIN 250 MG: 250 TABLET, FILM COATED ORAL at 09:27

## 2018-12-28 ASSESSMENT — PAIN SCALES - GENERAL
PAINLEVEL_OUTOF10: 0

## 2018-12-28 NOTE — DISCHARGE SUMMARY
[facility]    Patient Demographics    Chevy. Adelina Rockwell  Date of Birth. 1955  MRN. [id number]     Primary care provider. No primary care provider on file. (Tel: None)    Admit date: 12/24/2018    Discharge date (blank if same as Note Date): 12/28/2018  Note Date: 12/30/2018     Reason for Hospitalization. Chief Complaint   Patient presents with    Shortness of Breath     pt states she was at work two hours ago and had sudden onset of SOB with lightheadedness. labored breathing, states she couldnt catch her breath and started coughing yellow phlegm and felt weak. pt states her finger tips also came cyanotic. cold to the touch at this time         Significant Findings. Principal Problem:    Pulmonary embolism and infarction [facility])  Active Problems:    Pneumonia due to organism    Acute respiratory failure with hypoxia ([address]    Bilateral pulmonary embolism (HCC)  Resolved Problems:    * No resolved hospital problems. *       Problems and results from this hospitalization that need follow up. 1. Patient will need to follow-up with her primary care provider regarding her chronic medical issues, patient will need a reevaluation 6 muscles from now regarding continuing the anticoagulation or stopping it          [address] [place] [address] Dr Robledo.     66years old female who presents to the emergency department with a complaint of shortness of breath and lightheadedness and was admitted for the following medical problems:  Pulmonary embolism and infarction [facility]), hemodynamically stable now,  it looks unprovoked, no Hx of cancer, no prolonged sitting or travel no previous hx of PE/DVT, no recent surgery, active life style, no expressed risk of bleeding  -Where initially started on heparin drip, on xarelto now  -cardiology saw the pt, they recommend stress test as outpt when test is

## 2018-12-28 NOTE — PROGRESS NOTES
color, texture, turgor, no redness, warmth, or swelling at IV sites    DATA:    CBC:  Recent Labs      12/26/18 0414   WBC  6.0   RBC  4.31   HGB  12.9   HCT  39.2   PLT  143   MCV  91.0   MCH  30.0   MCHC  33.0   RDW  14.0      BMP:  Recent Labs      12/26/18 0414   NA  141   K  3.6   CL  107   CO2  24   BUN  12   CREATININE  0.8   CALCIUM  9.1   GLUCOSE  102*      ABG:  No results for input(s): PHART, HEM1JLC, PO2ART, ULN6IXN, V0OGHBVI, BEART in the last 72 hours. Cultures:    Abx:    Radiology Review:  Pertinent images / reports were reviewed as a part of this visit. Assessment:     1. Acute hypoxemic respiratory failure  2. Acute pulmonary embolism  3. Right upper lobe pneumonia    I have reviewed laboratories, medical records and images for this visit  CT imaging does reveal bilateral pulmonary emboli  There is also dense for right upper lobe infiltrate  Continue Oral azithromycin to complete a full course of antibiotic therapy  Would request chest x-ray is outpatient follow-up the infiltrate  Now on Xarelto and in the process of obtaining patient assistance for this medication  She will have 30 days of Xarelto at the time of her discharge  I think she could be discharged home without supplemental oxygen.

## 2018-12-29 LAB
BLOOD CULTURE, ROUTINE: NORMAL
CULTURE, BLOOD 2: NORMAL

## 2019-01-18 ENCOUNTER — OFFICE VISIT (OUTPATIENT)
Dept: PULMONOLOGY | Age: 64
End: 2019-01-18

## 2019-01-18 VITALS
DIASTOLIC BLOOD PRESSURE: 70 MMHG | WEIGHT: 239 LBS | BODY MASS INDEX: 37.43 KG/M2 | RESPIRATION RATE: 16 BRPM | HEART RATE: 100 BPM | OXYGEN SATURATION: 96 % | SYSTOLIC BLOOD PRESSURE: 115 MMHG

## 2019-01-18 DIAGNOSIS — I26.99 PULMONARY EMBOLISM AND INFARCTION (HCC): Primary | ICD-10-CM

## 2019-01-18 DIAGNOSIS — N95.0 POSTMENOPAUSAL VAGINAL BLEEDING: ICD-10-CM

## 2019-01-18 PROCEDURE — 99213 OFFICE O/P EST LOW 20 MIN: CPT | Performed by: INTERNAL MEDICINE

## 2019-01-18 ASSESSMENT — ENCOUNTER SYMPTOMS
CHEST TIGHTNESS: 0
RHINORRHEA: 0
APNEA: 0
ANAL BLEEDING: 0
BLOOD IN STOOL: 0
CHOKING: 0
COUGH: 0
BACK PAIN: 0
DIARRHEA: 0
WHEEZING: 0
ABDOMINAL DISTENTION: 0
SORE THROAT: 0
CONSTIPATION: 0
STRIDOR: 0
SHORTNESS OF BREATH: 1
VOICE CHANGE: 0
SINUS PRESSURE: 0
ABDOMINAL PAIN: 0

## 2019-01-22 ENCOUNTER — TELEPHONE (OUTPATIENT)
Dept: PULMONOLOGY | Age: 64
End: 2019-01-22

## 2019-01-22 DIAGNOSIS — R06.00 DYSPNEA, UNSPECIFIED TYPE: Primary | ICD-10-CM

## 2019-02-01 ENCOUNTER — HOSPITAL ENCOUNTER (OUTPATIENT)
Dept: CT IMAGING | Age: 64
Discharge: HOME | End: 2019-02-01

## 2019-02-01 DIAGNOSIS — R06.00 DYSPNEA, UNSPECIFIED TYPE: ICD-10-CM

## 2019-02-01 PROCEDURE — 71250 CT THORAX DX C-: CPT

## 2019-02-25 ENCOUNTER — TELEPHONE (OUTPATIENT)
Dept: PULMONOLOGY | Age: 64
End: 2019-02-25

## 2019-04-18 ENCOUNTER — OFFICE VISIT (OUTPATIENT)
Dept: PULMONOLOGY | Age: 64
End: 2019-04-18

## 2019-04-18 VITALS
DIASTOLIC BLOOD PRESSURE: 71 MMHG | WEIGHT: 244 LBS | SYSTOLIC BLOOD PRESSURE: 123 MMHG | RESPIRATION RATE: 20 BRPM | BODY MASS INDEX: 38.22 KG/M2 | HEART RATE: 84 BPM

## 2019-04-18 DIAGNOSIS — I26.99 OTHER ACUTE PULMONARY EMBOLISM WITHOUT ACUTE COR PULMONALE (HCC): Primary | ICD-10-CM

## 2019-04-18 PROCEDURE — 99213 OFFICE O/P EST LOW 20 MIN: CPT | Performed by: INTERNAL MEDICINE

## 2019-04-18 ASSESSMENT — ENCOUNTER SYMPTOMS
VOICE CHANGE: 0
APNEA: 0
SINUS PRESSURE: 0
ABDOMINAL PAIN: 0
WHEEZING: 0
CHEST TIGHTNESS: 0
STRIDOR: 0
ANAL BLEEDING: 0
COUGH: 0
BACK PAIN: 0
DIARRHEA: 0
RHINORRHEA: 0
SORE THROAT: 0
BLOOD IN STOOL: 0
SHORTNESS OF BREATH: 0
CHOKING: 0
ABDOMINAL DISTENTION: 0
CONSTIPATION: 0

## 2019-04-18 NOTE — PROGRESS NOTES
Colonel Alcantar    YOB: 1955     Date of Service:  4/18/2019     Chief Complaint   Patient presents with    Shortness of Breath     3 mos f/up          HPI doing well, still has severe right hip pain with significantly impaired mobility. Patient states that she may have some sinus drainage. No significant cough or phlegm. Patient states that her vaginal bleeding has completely gone away. Allergies   Allergen Reactions    Lisinopril      cough     No outpatient medications have been marked as taking for the 4/18/19 encounter (Office Visit) with Jenniffer Siddiqui MD.         There is no immunization history on file for this patient. Past Medical History:   Diagnosis Date    Hypertension      Past Surgical History:   Procedure Laterality Date    PATELLA SURGERY Right     TUBAL LIGATION       Family History   Problem Relation Age of Onset    Heart Disease Father     Heart Disease Paternal Aunt     Heart Disease Paternal Uncle     Diabetes Paternal Grandmother        Review of Systems:  Review of Systems   Constitutional: Negative for activity change, appetite change, fatigue and fever. HENT: Negative for congestion, ear discharge, ear pain, postnasal drip, rhinorrhea, sinus pressure, sneezing, sore throat, tinnitus and voice change. Respiratory: Negative for apnea, cough, choking, chest tightness, shortness of breath, wheezing and stridor. Cardiovascular: Negative for chest pain, palpitations and leg swelling. Gastrointestinal: Negative for abdominal distention, abdominal pain, anal bleeding, blood in stool, constipation and diarrhea. Musculoskeletal: Positive for arthralgias and gait problem. Negative for back pain. Skin: Negative for pallor and rash. Allergic/Immunologic: Negative for environmental allergies. Neurological: Negative for dizziness, tremors, seizures, syncope, speech difficulty, weakness, light-headedness, numbness and headaches. Hematological: Negative for adenopathy. Does not bruise/bleed easily. Psychiatric/Behavioral: Negative for sleep disturbance. Vitals:    04/18/19 1309   BP: 123/71   Pulse: 84   Resp: 20   Weight: 244 lb (110.7 kg)     Body mass index is 38.22 kg/m². Wt Readings from Last 3 Encounters:   04/18/19 244 lb (110.7 kg)   01/18/19 239 lb (108.4 kg)   12/28/18 231 lb 0.7 oz (104.8 kg)     BP Readings from Last 3 Encounters:   04/18/19 123/71   01/18/19 115/70   12/28/18 (!) 148/70         Physical Exam   Constitutional: She is oriented to person, place, and time. She appears well-developed and well-nourished. No distress. HENT:   Mouth/Throat: Oropharynx is clear and moist. No oropharyngeal exudate. Cardiovascular: Normal rate, regular rhythm, normal heart sounds and intact distal pulses. No murmur heard. Pulmonary/Chest: No respiratory distress. She has no wheezes. She has rales. She exhibits no tenderness. Abdominal: She exhibits no distension and no mass. There is no tenderness. There is no rebound and no guarding. Musculoskeletal: She exhibits no edema or deformity. Neurological: She is alert and oriented to person, place, and time. She displays normal reflexes. No cranial nerve deficit. She exhibits normal muscle tone. Coordination normal.   Skin: No rash noted. She is not diaphoretic. No erythema. No pallor.            Health Maintenance   Topic Date Due    Hepatitis C screen  1955    HIV screen  10/18/1972    DTaP/Tdap/Td vaccine (1 - Tdap) 10/18/1976    Cervical cancer screen  10/18/1978    Diabetes screen  10/18/1997    Breast cancer screen  10/18/2007    Shingles Vaccine (1 of 2) 10/18/2007    Colon cancer screen colonoscopy  10/18/2007    Flu vaccine (Season Ended) 09/01/2019    Potassium monitoring  12/26/2019    Creatinine monitoring  12/26/2019    Low dose CT lung screening  02/01/2020    Lipid screen  12/27/2023    Pneumococcal 0-64 years Vaccine  Aged [place]

## 2019-06-21 RX ORDER — RIVAROXABAN 20 MG/1
TABLET, FILM COATED ORAL
Qty: 30 TABLET | Refills: 6 | Status: SHIPPED | OUTPATIENT
Start: 2019-06-21 | End: 2020-02-10 | Stop reason: SDUPTHER

## 2020-01-09 ENCOUNTER — OFFICE VISIT (OUTPATIENT)
Dept: PULMONOLOGY | Age: 65
End: 2020-01-09

## 2020-01-09 ENCOUNTER — HOSPITAL ENCOUNTER (OUTPATIENT)
Age: 65
Discharge: HOME | End: 2020-01-09

## 2020-01-09 VITALS
HEART RATE: 63 BPM | SYSTOLIC BLOOD PRESSURE: 122 MMHG | BODY MASS INDEX: 39 KG/M2 | RESPIRATION RATE: 16 BRPM | WEIGHT: 249 LBS | OXYGEN SATURATION: 100 % | DIASTOLIC BLOOD PRESSURE: 80 MMHG

## 2020-01-09 PROCEDURE — 99213 OFFICE O/P EST LOW 20 MIN: CPT | Performed by: INTERNAL MEDICINE

## 2020-01-09 PROCEDURE — 86147 CARDIOLIPIN ANTIBODY EA IG: CPT

## 2020-01-09 PROCEDURE — 85301 ANTITHROMBIN III ANTIGEN: CPT

## 2020-01-09 PROCEDURE — 85300 ANTITHROMBIN III ACTIVITY: CPT

## 2020-01-09 PROCEDURE — 81241 F5 GENE: CPT

## 2020-01-09 PROCEDURE — 81240 F2 GENE: CPT

## 2020-01-09 ASSESSMENT — ENCOUNTER SYMPTOMS
ANAL BLEEDING: 0
APNEA: 0
CONSTIPATION: 0
DIARRHEA: 0
CHOKING: 0
VOICE CHANGE: 0
RHINORRHEA: 0
SHORTNESS OF BREATH: 0
ABDOMINAL PAIN: 0
SINUS PRESSURE: 0
BACK PAIN: 0
CHEST TIGHTNESS: 0
SORE THROAT: 0
STRIDOR: 0
ABDOMINAL DISTENTION: 0
WHEEZING: 0
COUGH: 0
BLOOD IN STOOL: 0

## 2020-01-09 NOTE — PROGRESS NOTES
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 Poor mobility due to arthritis. No dyspnea on exertion. Denies any cough or phlegm. No leg edema. Allergies   Allergen Reactions    Lisinopril      cough     No outpatient medications have been marked as taking for the 1/9/20 encounter (Office Visit) with Chelsi Simmons MD.         There is no immunization history on file for this patient. Past Medical History:   Diagnosis Date    Hypertension      Past Surgical History:   Procedure Laterality Date    PATELLA SURGERY Right     TUBAL LIGATION       Family History   Problem Relation Age of Onset    Heart Disease Father     Heart Disease Paternal Aunt     Heart Disease Paternal Uncle     Diabetes Paternal Grandmother        Review of Systems:  Review of Systems   Constitutional: Negative for activity change, appetite change, fatigue and fever. HENT: Negative for congestion, ear discharge, ear pain, postnasal drip, rhinorrhea, sinus pressure, sneezing, sore throat, tinnitus and voice change. Respiratory: Negative for apnea, cough, choking, chest tightness, shortness of breath, wheezing and stridor. Cardiovascular: Negative for chest pain, palpitations and leg swelling. Gastrointestinal: Negative for abdominal distention, abdominal pain, anal bleeding, blood in stool, constipation and diarrhea. Musculoskeletal: Negative for arthralgias, back pain and gait problem. Skin: Negative for pallor and rash. Allergic/Immunologic: Negative for environmental allergies. Neurological: Negative for dizziness, tremors, seizures, syncope, speech difficulty, weakness, light-headedness, numbness and headaches. Hematological: Negative for adenopathy. Does not bruise/bleed easily.    Psychiatric/Behavioral: Negative for sleep

## 2020-01-11 LAB
ANTICARDIOLIPIN IGG ANTIBODY: 9 GPL (ref 0–14)
CARDIOLIPIN AB IGM: 0 MPL (ref 0–12)

## 2020-01-13 LAB
ANTITHROMBIN ACTIVITY: 97 % (ref 76–128)
ANTITHROMBIN ANTIGEN: 92 % (ref 82–136)

## 2020-01-15 LAB
FACTOR V LEIDEN: NEGATIVE
SPECIMEN: NORMAL

## 2020-01-16 LAB
PROTHROMBIN G20210A MUTATION: NEGATIVE
PT PCR SPECIMEN: NORMAL

## 2020-02-10 NOTE — TELEPHONE ENCOUNTER
Pt calling in need of a refill on her Xarelto to [facility] in [place] and also would like results of recent testing done-blood work.  Please call her back with these results 21 [phone]

## 2020-07-21 ENCOUNTER — VIRTUAL VISIT (OUTPATIENT)
Dept: PULMONOLOGY | Age: 65
End: 2020-07-21

## 2020-07-21 PROCEDURE — 99213 OFFICE O/P EST LOW 20 MIN: CPT | Performed by: INTERNAL MEDICINE

## 2020-07-21 ASSESSMENT — ENCOUNTER SYMPTOMS
DIARRHEA: 0
VOICE CHANGE: 0
SHORTNESS OF BREATH: 0
WHEEZING: 0
CONSTIPATION: 0
CHEST TIGHTNESS: 0
ANAL BLEEDING: 0
COUGH: 0
SORE THROAT: 0
STRIDOR: 0
CHOKING: 0
RHINORRHEA: 0
SINUS PRESSURE: 0
ABDOMINAL PAIN: 0
ABDOMINAL DISTENTION: 0
BLOOD IN STOOL: 0
BACK PAIN: 0
APNEA: 0

## 2020-07-21 NOTE — PROGRESS NOTES
Mason Savage     Pulmonology Video Visit    Pursuant to the emergency declaration under the [facility], [address]5 waiver authority and the [employer] and [employer] General Act this Video Visit was insisted, with patient's consent, to reduce the patient's risk of exposure to COVID-19 and provide continuity of care for an established patient. The patient was at home, while the provider was at the clinic. Services were provided through a synchronous discussion through a Video Visit to substitute for in-person clinic visit, and coded as such. YOB: 1955     Date of Service:  7/21/2020     Chief Complaint   Patient presents with    Pulmonary Embolism     Doxy Visit - pt states that her breathing is fine         HPI doxy visit. Patient states that she has gone of Xarelto since April since she could not afford the medication-costs $800. Patient noted to have submassive PE with pulmonary infarction in December 2018-has had more than 1 year of therapy. Still has some issues with poor mobility-right hip and knee pain. Currently patient denies any shortness of breath, cough, phlegm or pleurisy. Allergies   Allergen Reactions    Lisinopril      cough     No outpatient medications have been marked as taking for the 7/21/20 encounter (Virtual Visit) with Channing Cullen MD.         There is no immunization history on file for this patient. Past Medical History:   Diagnosis Date    Hypertension      Past Surgical History:   Procedure Laterality Date    PATELLA SURGERY Right     TUBAL LIGATION       Family History   Problem Relation Age of Onset    Heart Disease Father     Heart Disease Paternal Aunt     Heart Disease Paternal Uncle     Diabetes Paternal Grandmother        Review of Systems:  Review of Systems   Constitutional: Negative for activity change, appetite change, fatigue and fever.    HENT: Negative for congestion, ear discharge, ear pain, postnasal drip, rhinorrhea, sinus pressure, sneezing, sore throat, tinnitus and voice change. Respiratory: Negative for apnea, cough, choking, chest tightness, shortness of breath, wheezing and stridor. Cardiovascular: Negative for chest pain, palpitations and leg swelling. Gastrointestinal: Negative for abdominal distention, abdominal pain, anal bleeding, blood in stool, constipation and diarrhea. Musculoskeletal: Negative for arthralgias, back pain and gait problem. Skin: Negative for pallor and rash. Allergic/Immunologic: Negative for environmental allergies. Neurological: Negative for dizziness, tremors, seizures, syncope, speech difficulty, weakness, light-headedness, numbness and headaches. Hematological: Negative for adenopathy. Does not bruise/bleed easily. Psychiatric/Behavioral: Negative for sleep disturbance. There were no vitals filed for this visit. Patient-Reported Vitals 7/21/2020   Patient-Reported Weight 249lb   Patient-Reported Height 5' 7\"      There is no height or weight on file to calculate BMI. Wt Readings from Last 3 Encounters:   01/09/20 249 lb (112.9 kg)   04/18/19 244 lb (110.7 kg)   01/18/19 239 lb (108.4 kg)     BP Readings from Last 3 Encounters:   01/09/20 122/80   04/18/19 123/71   01/18/19 115/70         Physical Exam    Due to the current efforts to prevent transmission of COVID-19 and also the need to preserve PPE for other caregivers, a face-to-face encounter with the patient was not performed. That being said, all relevant records and diagnostic tests were reviewed, including laboratory results and imaging. Please reference any relevant documentation elsewhere. Care will be coordinated with the primary service.     Health Maintenance   Topic Date Due    Hepatitis C screen  1955    HIV screen  10/18/1972    DTaP/Tdap/Td vaccine (1 - Tdap) 10/18/1976    Cervical cancer screen  10/18/1978    Breast cancer screen  10/18/2007    Shingles Vaccine (1 of 2) 10/18/2007    Colon cancer screen colonoscopy  10/18/2007    Low dose CT lung screening  02/01/2020    Flu vaccine (1) 09/01/2020    Lipid screen  12/27/2023    Hepatitis A vaccine  Aged Out    Hepatitis B vaccine  Aged Out    Hib vaccine  Aged Out    Meningococcal (ACWY) vaccine  Aged Out    Pneumococcal 0-64 years Vaccine  Aged Out          Assessment/Plan:    Provoked PE-thought to be related to poor mobility, December 2018. However decision was made about lifelong anticoagulation due to continued difficulty with mobility and the submassive nature of the PE. Patient has been unable to afford Xarelto due to cost-we will look into alternatives such as Pradaxa and Eliquis. Patient is not very keen on the idea of going on Coumadin. Currently asymptomatic, no imaging would be requested. Follow-up in 6 months.

## 2020-07-22 ENCOUNTER — TELEPHONE (OUTPATIENT)
Dept: PULMONOLOGY | Age: 65
End: 2020-07-22

## 2020-07-22 NOTE — TELEPHONE ENCOUNTER
I called Nader Jean-Baptiste Patient Asst at [phone] and spoke w/Kenia in regards to the pt's [address] faxed the forms for the pt asst and I informed the pt that she will receive the forms thru the mail as well    I tried to reach the pt but her vm was not set up - I did reach her dtr Oscar Padilla and she will have the pt call our office

## 2020-09-22 ENCOUNTER — TELEPHONE (OUTPATIENT)
Dept: PULMONOLOGY | Age: 65
End: 2020-09-22

## 2022-06-10 ENCOUNTER — HOSPITAL ENCOUNTER (OUTPATIENT)
Dept: GENERAL RADIOLOGY | Age: 67
Discharge: HOME | End: 2022-06-10
Payer: COMMERCIAL

## 2022-06-10 ENCOUNTER — HOSPITAL ENCOUNTER (OUTPATIENT)
Age: 67
Discharge: HOME | End: 2022-06-10
Payer: COMMERCIAL

## 2022-06-10 DIAGNOSIS — Z01.818 PREOP TESTING: ICD-10-CM

## 2022-06-10 LAB
ABO/RH: NORMAL
ANION GAP SERPL CALCULATED.3IONS-SCNC: 14 MMOL/L (ref 3–16)
ANTIBODY SCREEN: NORMAL
BACTERIA: ABNORMAL /HPF
BASOPHILS ABSOLUTE: 0.1 K/UL (ref 0–0.2)
BASOPHILS RELATIVE PERCENT: 1.2 %
BILIRUBIN URINE: NEGATIVE
BLOOD, URINE: ABNORMAL
BUN BLDV-MCNC: 19 MG/DL (ref 7–20)
CALCIUM SERPL-MCNC: 9.8 MG/DL (ref 8.3–10.6)
CHLORIDE BLD-SCNC: 110 MMOL/L (ref 99–110)
CLARITY: ABNORMAL
CO2: 20 MMOL/L (ref 21–32)
COLOR: YELLOW
CREAT SERPL-MCNC: 0.9 MG/DL (ref 0.6–1.2)
EOSINOPHILS ABSOLUTE: 0.3 K/UL (ref 0–0.6)
EOSINOPHILS RELATIVE PERCENT: 7.2 %
EPITHELIAL CELLS, UA: 12 /HPF (ref 0–5)
GFR AFRICAN AMERICAN: >60
GFR NON-AFRICAN AMERICAN: >60
GLUCOSE BLD-MCNC: 96 MG/DL (ref 70–99)
GLUCOSE URINE: NEGATIVE MG/DL
HCT VFR BLD CALC: 38.8 % (ref 36–48)
HEMOGLOBIN: 13 G/DL (ref 12–16)
HYALINE CASTS: 0 /LPF (ref 0–8)
KETONES, URINE: ABNORMAL MG/DL
LEUKOCYTE ESTERASE, URINE: ABNORMAL
LYMPHOCYTES ABSOLUTE: 2.1 K/UL (ref 1–5.1)
LYMPHOCYTES RELATIVE PERCENT: 46.9 %
MCH RBC QN AUTO: 30.7 PG (ref 26–34)
MCHC RBC AUTO-ENTMCNC: 33.6 G/DL (ref 31–36)
MCV RBC AUTO: 91.4 FL (ref 80–100)
MICROSCOPIC EXAMINATION: YES
MONOCYTES ABSOLUTE: 0.3 K/UL (ref 0–1.3)
MONOCYTES RELATIVE PERCENT: 5.6 %
NEUTROPHILS ABSOLUTE: 1.8 K/UL (ref 1.7–7.7)
NEUTROPHILS RELATIVE PERCENT: 39.1 %
NITRITE, URINE: NEGATIVE
PDW BLD-RTO: 14.6 % (ref 12.4–15.4)
PH UA: 5 (ref 5–8)
PLATELET # BLD: 201 K/UL (ref 135–450)
PMV BLD AUTO: 10.7 FL (ref 5–10.5)
POTASSIUM SERPL-SCNC: 4.1 MMOL/L (ref 3.5–5.1)
PROTEIN UA: 30 MG/DL
RBC # BLD: 4.24 M/UL (ref 4–5.2)
RBC UA: 90 /HPF (ref 0–4)
SODIUM BLD-SCNC: 144 MMOL/L (ref 136–145)
SPECIFIC GRAVITY UA: >=1.03 (ref 1–1.03)
URINE TYPE: ABNORMAL
UROBILINOGEN, URINE: 1 E.U./DL
WBC # BLD: 4.6 K/UL (ref 4–11)
WBC UA: 11 /HPF (ref 0–5)

## 2022-06-10 PROCEDURE — 81001 URINALYSIS AUTO W/SCOPE: CPT

## 2022-06-10 PROCEDURE — 86850 RBC ANTIBODY SCREEN: CPT

## 2022-06-10 PROCEDURE — 71046 X-RAY EXAM CHEST 2 VIEWS: CPT

## 2022-06-10 PROCEDURE — 86900 BLOOD TYPING SEROLOGIC ABO: CPT

## 2022-06-10 PROCEDURE — 85025 COMPLETE CBC W/AUTO DIFF WBC: CPT

## 2022-06-10 PROCEDURE — 80048 BASIC METABOLIC PNL TOTAL CA: CPT

## 2022-06-10 PROCEDURE — 86901 BLOOD TYPING SEROLOGIC RH(D): CPT

## 2022-06-10 PROCEDURE — 93005 ELECTROCARDIOGRAM TRACING: CPT | Performed by: OBSTETRICS & GYNECOLOGY

## 2022-06-10 PROCEDURE — 36415 COLL VENOUS BLD VENIPUNCTURE: CPT

## 2022-06-10 PROCEDURE — 87086 URINE CULTURE/COLONY COUNT: CPT

## 2022-06-11 LAB
EKG ATRIAL RATE: 75 BPM
EKG DIAGNOSIS: NORMAL
EKG P AXIS: 48 DEGREES
EKG P-R INTERVAL: 186 MS
EKG Q-T INTERVAL: 390 MS
EKG QRS DURATION: 102 MS
EKG QTC CALCULATION (BAZETT): 435 MS
EKG R AXIS: -23 DEGREES
EKG T AXIS: 33 DEGREES
EKG VENTRICULAR RATE: 75 BPM
URINE CULTURE, ROUTINE: NORMAL

## 2022-06-11 PROCEDURE — 93010 ELECTROCARDIOGRAM REPORT: CPT | Performed by: INTERNAL MEDICINE

## 2022-06-13 VITALS — BODY MASS INDEX: 45.67 KG/M2 | HEIGHT: 67 IN | WEIGHT: 291 LBS

## 2022-06-13 RX ORDER — APIXABAN 2.5 MG/1
TABLET, FILM COATED ORAL
Status: ON HOLD | COMMUNITY
Start: 2022-05-19 | End: 2022-09-12 | Stop reason: SDUPTHER

## 2022-06-13 RX ORDER — ATORVASTATIN CALCIUM 20 MG/1
TABLET, FILM COATED ORAL
COMMUNITY
Start: 2022-05-17

## 2022-06-13 RX ORDER — FUROSEMIDE 20 MG/1
TABLET ORAL
COMMUNITY
Start: 2022-06-02

## 2022-06-13 RX ORDER — LOSARTAN POTASSIUM 50 MG/1
TABLET ORAL
COMMUNITY

## 2022-06-13 NOTE — PROGRESS NOTES
24 hrs. Your surgery will be cancelled if you do not have a ride home. 8. A parent/legal guardian must accompany a child scheduled for surgery and plan to stay at the hospital until the child is discharged. Please do not bring other children with you. 9. Please wear simple, loose fitting clothing to the hospital.  Fabricio Palacios not bring valuables (money, credit cards, checkbooks, etc.) Do not wear any makeup (including no eye makeup) or nail polish on your fingers or toes. 10. DO NOT wear any jewelry or piercings on day of surgery. All body piercing jewelry must be removed. 11. If you have ___dentures, they will be removed before going to the OR; we will provide you a container. If you wear ___contact lenses or ___glasses, they will be removed; please bring a case for them. 12. Please see your family doctor/pediatrician for a history & physical and/or concerning medications. Bring any test results/reports from your physician's office. PCP__________________Phone___________H&P Appt. Date________             13 If you  have a Living Will and Durable Power of  for Healthcare, please bring in a copy. 15. Notify your Surgeon if you develop any illness between now and surgery  time, cough, cold, fever, sore throat, nausea, vomiting, etc.  Please notify your surgeon if you experience dizziness, shortness of breath or blurred vision between now & the time of your surgery             15. DO NOT shave your operative site 96 hours prior to surgery. For face & neck surgery, men may use an electric razor 48 hours prior to surgery. 16. Shower the night before or morning of surgery using an antibacterial soap or as you have been instructed. 17. To provide excellent care visitors will be limited to one in the room at any given time. 18.  Please bring picture ID and insurance card.              19.  Visit our web site for additional information:  [email or web address]              20.During flu season no children under the age of 15 are permitted in the hospital for the safety of all patients. 21. If you take a long acting insulin in the evening only  take half of your usual  dose the night  before your procedure              22. If you use a c-pap please bring DOS if staying overnight,             23.For your convenience [facility] has a pharmacy on site to fill your prescriptions. 24. If you use oxygen and have a portable tank please bring it  with you the DOS             25. Bring a complete list of all your medications with name and dose include any supplements. 26. Other__________________________________________   *Please call pre admission testing if you any further questions   [address]    [facility] [place]. [place]  [phone]   [address] [place] [address]       VISITOR POLICY(subject to change)    Current policy is 2 visitors per patient. No children. A mask is required. Visiting hours are 8a-8p. Overnight visitors will be at the discretion of the nurse. All above information reviewed with patient in person or by phone. Patient verbalizes understanding. All questions and concerns addressed.                                                                                                  Patient/Rep_patient___________________                                                                                                                                    PRE OP INSTRUCTIONS

## 2022-06-13 NOTE — PROGRESS NOTES
Called  office about H&P. Robert Martin will call patient about getting a clearance with  Her PCP. I also told patient that she has to have a ride home or surgery will be canceled.

## 2022-08-08 ENCOUNTER — ANESTHESIA (OUTPATIENT)
Dept: OPERATING ROOM | Age: 67
End: 2022-08-08
Payer: COMMERCIAL

## 2022-08-08 ENCOUNTER — ANESTHESIA EVENT (OUTPATIENT)
Dept: OPERATING ROOM | Age: 67
End: 2022-08-08
Payer: COMMERCIAL

## 2022-08-08 ENCOUNTER — HOSPITAL ENCOUNTER (OUTPATIENT)
Age: 67
Setting detail: OUTPATIENT SURGERY
Discharge: HOME | End: 2022-08-08
Attending: OBSTETRICS & GYNECOLOGY | Admitting: OBSTETRICS & GYNECOLOGY
Payer: COMMERCIAL

## 2022-08-08 DIAGNOSIS — Z01.818 PREOP TESTING: Primary | ICD-10-CM

## 2022-08-08 LAB
BACTERIA: ABNORMAL /HPF
BILIRUBIN URINE: NEGATIVE
BLOOD, URINE: ABNORMAL
CLARITY: CLEAR
COLOR: YELLOW
EPITHELIAL CELLS, UA: 9 /HPF (ref 0–5)
GLUCOSE URINE: NEGATIVE MG/DL
HYALINE CASTS: 2 /LPF (ref 0–8)
KETONES, URINE: NEGATIVE MG/DL
LEUKOCYTE ESTERASE, URINE: ABNORMAL
MICROSCOPIC EXAMINATION: YES
NITRITE, URINE: NEGATIVE
PH UA: 5 (ref 5–8)
PROTEIN UA: ABNORMAL MG/DL
RBC UA: 58 /HPF (ref 0–4)
SPECIFIC GRAVITY UA: 1.02 (ref 1–1.03)
URINE TYPE: ABNORMAL
UROBILINOGEN, URINE: 1 E.U./DL
WBC UA: 8 /HPF (ref 0–5)

## 2022-08-08 PROCEDURE — 81001 URINALYSIS AUTO W/SCOPE: CPT

## 2022-08-08 RX ORDER — LIDOCAINE HYDROCHLORIDE 10 MG/ML
0.5 INJECTION, SOLUTION EPIDURAL; INFILTRATION; INTRACAUDAL; PERINEURAL ONCE
Status: DISCONTINUED | OUTPATIENT
Start: 2022-08-08 | End: 2022-08-09 | Stop reason: HOSPADM

## 2022-08-08 RX ORDER — SODIUM CHLORIDE, SODIUM LACTATE, POTASSIUM CHLORIDE, CALCIUM CHLORIDE 600; 310; 30; 20 MG/100ML; MG/100ML; MG/100ML; MG/100ML
INJECTION, SOLUTION INTRAVENOUS CONTINUOUS
Status: DISCONTINUED | OUTPATIENT
Start: 2022-08-08 | End: 2022-08-09 | Stop reason: HOSPADM

## 2022-09-07 NOTE — PROGRESS NOTES
Name_______________________________________Printed:____________________  Date and time of surgery___9/12/2022_________0830____________Arrival Time:_____0630___________   1. The instructions given regarding when and if a patient needs to stop oral intake prior to surgery varies. Follow the specific instructions you were given                  __x_Nothing to eat or to drink after Midnight the night before.                   ____Carbo loading or ERAS instructions will be given to select patients-if you have been given those instructions -please do the following                           The evening before your surgery after dinner before midnight drink 40 ounces of gatorade. If you are diabetic use sugar free. The morning of surgery drink 40 ounces of water. This needs to be finished 3 hours prior to your surgery start time. 2. Take the following pills with a small sip of water on the morning of surgery___________________________________________________                  Do not take blood pressure medications ending in pril or sartan the thomas prior to surgery or the morning of surgery_   3. Aspirin, Ibuprofen, Advil, Naproxen, Vitamin E and other Anti-inflammatory products and supplements should be stopped for 5 -7days before surgery or as directed by your physician. 4. Check with your Doctor regarding stopping Plavix, Coumadin,Eliquis, Lovenox,Effient,Pradaxa,Xarelto, Fragmin or other blood thinners and follow their instructions. 5. Do not smoke, and do not drink any alcoholic beverages 24 hours prior to surgery. This includes NA Beer. Refrain from the usage of any recreational drugs. 6. You may brush your teeth and gargle the morning of surgery. DO NOT SWALLOW WATER   7. You MUST make arrangements for a responsible adult to stay on site while you are here and take you home after your surgery. You will not be allowed to leave alone or drive yourself home.   It is strongly suggested someone stay with you the first 24 hrs. Your surgery will be cancelled if you do not have a ride home. 8. A parent/legal guardian must accompany a child scheduled for surgery and plan to stay at the hospital until the child is discharged. Please do not bring other children with you. 9. Please wear simple, loose fitting clothing to the hospital.  Ketty Bowling not bring valuables (money, credit cards, checkbooks, etc.) Do not wear any makeup (including no eye makeup) or nail polish on your fingers or toes. 10. DO NOT wear any jewelry or piercings on day of surgery. All body piercing jewelry must be removed. 11. If you have ___dentures, they will be removed before going to the OR; we will provide you a container. If you wear ___contact lenses or ___glasses, they will be removed; please bring a case for them. 12. Please see your family doctor/pediatrician for a history & physical and/or concerning medications. Bring any test results/reports from your physician's office. PCP__________________Phone___________H&P Appt. Date________             13 If you  have a Living Will and Durable Power of  for Healthcare, please bring in a copy. 15. Notify your Surgeon if you develop any illness between now and surgery  time, cough, cold, fever, sore throat, nausea, vomiting, etc.  Please notify your surgeon if you experience dizziness, shortness of breath or blurred vision between now & the time of your surgery             15. DO NOT shave your operative site 96 hours prior to surgery. For face & neck surgery, men may use an electric razor 48 hours prior to surgery. 16. Shower the night before or morning of surgery using an antibacterial soap or as you have been instructed. 17. To provide excellent care visitors will be limited to one in the room at any given time. 18.  Please bring picture ID and insurance card.              19.  Visit our web site for additional information:  [email or web address]              20.During flu season no children under the age of 15 are permitted in the hospital for the safety of all patients. 21. If you take a long acting insulin in the evening only  take half of your usual  dose the night  before your procedure              22. If you use a c-pap please bring DOS if staying overnight,             23.For your convenience [address] [place] [address] has a pharmacy on site to fill your prescriptions. 24. If you use oxygen and have a portable tank please bring it  with you the DOS             25. Bring a complete list of all your medications with name and dose include any supplements. 26. Other__________________________________________   *Please call pre admission testing if you any further questions   Ashley Encarnacion   [address]    [place] [id number]. [place]  [phone]   [address] [place] [address]       VISITOR POLICY(subject to change)    Current policy is 2 visitors per patient. No children. A mask is required. Visiting hours are 8a-8p. Overnight visitors will be at the discretion of the nurse. All above information reviewed with patient in person or by phone. Patient verbalizes understanding. All questions and concerns addressed.                                                                                                  Patient/Rep__patient__________________                                                                                                                                    PRE OP INSTRUCTIONS

## 2022-09-12 ENCOUNTER — ANESTHESIA (OUTPATIENT)
Dept: OPERATING ROOM | Age: 67
End: 2022-09-12
Payer: COMMERCIAL

## 2022-09-12 ENCOUNTER — HOSPITAL ENCOUNTER (OUTPATIENT)
Age: 67
Setting detail: OUTPATIENT SURGERY
Discharge: HOME | End: 2022-09-12
Attending: OBSTETRICS & GYNECOLOGY | Admitting: OBSTETRICS & GYNECOLOGY
Payer: COMMERCIAL

## 2022-09-12 ENCOUNTER — ANESTHESIA EVENT (OUTPATIENT)
Dept: OPERATING ROOM | Age: 67
End: 2022-09-12
Payer: COMMERCIAL

## 2022-09-12 VITALS
DIASTOLIC BLOOD PRESSURE: 72 MMHG | HEART RATE: 64 BPM | OXYGEN SATURATION: 98 % | SYSTOLIC BLOOD PRESSURE: 144 MMHG | WEIGHT: 284.9 LBS | TEMPERATURE: 97.8 F | HEIGHT: 67 IN | BODY MASS INDEX: 44.72 KG/M2 | RESPIRATION RATE: 16 BRPM

## 2022-09-12 PROBLEM — N95.0 POSTMENOPAUSAL BLEEDING: Status: ACTIVE | Noted: 2022-09-12

## 2022-09-12 LAB
ABO/RH: NORMAL
ANION GAP SERPL CALCULATED.3IONS-SCNC: 12 MMOL/L (ref 3–16)
ANTIBODY SCREEN: NORMAL
BASOPHILS ABSOLUTE: 0 K/UL (ref 0–0.2)
BASOPHILS RELATIVE PERCENT: 0.9 %
BUN BLDV-MCNC: 11 MG/DL (ref 7–20)
CALCIUM SERPL-MCNC: 10.8 MG/DL (ref 8.3–10.6)
CHLORIDE BLD-SCNC: 105 MMOL/L (ref 99–110)
CO2: 25 MMOL/L (ref 21–32)
CREAT SERPL-MCNC: 1 MG/DL (ref 0.6–1.2)
EOSINOPHILS ABSOLUTE: 0.4 K/UL (ref 0–0.6)
EOSINOPHILS RELATIVE PERCENT: 8.4 %
GFR AFRICAN AMERICAN: >60
GFR NON-AFRICAN AMERICAN: 56
GLUCOSE BLD-MCNC: 127 MG/DL (ref 70–99)
HCG(URINE) PREGNANCY TEST: NEGATIVE
HCT VFR BLD CALC: 41.2 % (ref 36–48)
HEMOGLOBIN: 13.6 G/DL (ref 12–16)
LYMPHOCYTES ABSOLUTE: 1.8 K/UL (ref 1–5.1)
LYMPHOCYTES RELATIVE PERCENT: 34.3 %
MCH RBC QN AUTO: 30.3 PG (ref 26–34)
MCHC RBC AUTO-ENTMCNC: 33.1 G/DL (ref 31–36)
MCV RBC AUTO: 91.5 FL (ref 80–100)
MONOCYTES ABSOLUTE: 0.3 K/UL (ref 0–1.3)
MONOCYTES RELATIVE PERCENT: 5.7 %
NEUTROPHILS ABSOLUTE: 2.6 K/UL (ref 1.7–7.7)
NEUTROPHILS RELATIVE PERCENT: 50.7 %
PDW BLD-RTO: 14.8 % (ref 12.4–15.4)
PLATELET # BLD: 228 K/UL (ref 135–450)
PMV BLD AUTO: 10.1 FL (ref 5–10.5)
POTASSIUM SERPL-SCNC: 4 MMOL/L (ref 3.5–5.1)
RBC # BLD: 4.5 M/UL (ref 4–5.2)
SODIUM BLD-SCNC: 142 MMOL/L (ref 136–145)
WBC # BLD: 5.1 K/UL (ref 4–11)

## 2022-09-12 PROCEDURE — 36415 COLL VENOUS BLD VENIPUNCTURE: CPT

## 2022-09-12 PROCEDURE — 6370000000 HC RX 637 (ALT 250 FOR IP)

## 2022-09-12 PROCEDURE — 86850 RBC ANTIBODY SCREEN: CPT

## 2022-09-12 PROCEDURE — 84703 CHORIONIC GONADOTROPIN ASSAY: CPT

## 2022-09-12 PROCEDURE — 2709999900 HC NON-CHARGEABLE SUPPLY: Performed by: OBSTETRICS & GYNECOLOGY

## 2022-09-12 PROCEDURE — 87624 HPV HI-RISK TYP POOLED RSLT: CPT

## 2022-09-12 PROCEDURE — 80048 BASIC METABOLIC PNL TOTAL CA: CPT

## 2022-09-12 PROCEDURE — 86901 BLOOD TYPING SEROLOGIC RH(D): CPT

## 2022-09-12 PROCEDURE — 7100000000 HC PACU RECOVERY - FIRST 15 MIN: Performed by: OBSTETRICS & GYNECOLOGY

## 2022-09-12 PROCEDURE — 3600000014 HC SURGERY LEVEL 4 ADDTL 15MIN: Performed by: OBSTETRICS & GYNECOLOGY

## 2022-09-12 PROCEDURE — 88307 TISSUE EXAM BY PATHOLOGIST: CPT

## 2022-09-12 PROCEDURE — 87086 URINE CULTURE/COLONY COUNT: CPT

## 2022-09-12 PROCEDURE — 85025 COMPLETE CBC W/AUTO DIFF WBC: CPT

## 2022-09-12 PROCEDURE — 88305 TISSUE EXAM BY PATHOLOGIST: CPT

## 2022-09-12 PROCEDURE — 7100000001 HC PACU RECOVERY - ADDTL 15 MIN: Performed by: OBSTETRICS & GYNECOLOGY

## 2022-09-12 PROCEDURE — 88342 IMHCHEM/IMCYTCHM 1ST ANTB: CPT

## 2022-09-12 PROCEDURE — 7100000010 HC PHASE II RECOVERY - FIRST 15 MIN: Performed by: OBSTETRICS & GYNECOLOGY

## 2022-09-12 PROCEDURE — 2720000010 HC SURG SUPPLY STERILE: Performed by: OBSTETRICS & GYNECOLOGY

## 2022-09-12 PROCEDURE — 2500000003 HC RX 250 WO HCPCS: Performed by: NURSE ANESTHETIST, CERTIFIED REGISTERED

## 2022-09-12 PROCEDURE — 88175 CYTOPATH C/V AUTO FLUID REDO: CPT

## 2022-09-12 PROCEDURE — 3700000001 HC ADD 15 MINUTES (ANESTHESIA): Performed by: OBSTETRICS & GYNECOLOGY

## 2022-09-12 PROCEDURE — 3700000000 HC ANESTHESIA ATTENDED CARE: Performed by: OBSTETRICS & GYNECOLOGY

## 2022-09-12 PROCEDURE — 6360000002 HC RX W HCPCS: Performed by: NURSE ANESTHETIST, CERTIFIED REGISTERED

## 2022-09-12 PROCEDURE — 7100000011 HC PHASE II RECOVERY - ADDTL 15 MIN: Performed by: OBSTETRICS & GYNECOLOGY

## 2022-09-12 PROCEDURE — 3600000004 HC SURGERY LEVEL 4 BASE: Performed by: OBSTETRICS & GYNECOLOGY

## 2022-09-12 PROCEDURE — 6360000002 HC RX W HCPCS: Performed by: ANESTHESIOLOGY

## 2022-09-12 PROCEDURE — 2580000003 HC RX 258: Performed by: OBSTETRICS & GYNECOLOGY

## 2022-09-12 PROCEDURE — 86900 BLOOD TYPING SEROLOGIC ABO: CPT

## 2022-09-12 RX ORDER — LABETALOL HYDROCHLORIDE 5 MG/ML
5 INJECTION, SOLUTION INTRAVENOUS
Status: DISCONTINUED | OUTPATIENT
Start: 2022-09-12 | End: 2022-09-12 | Stop reason: HOSPADM

## 2022-09-12 RX ORDER — LIDOCAINE HYDROCHLORIDE 10 MG/ML
1 INJECTION, SOLUTION EPIDURAL; INFILTRATION; INTRACAUDAL; PERINEURAL
Status: DISCONTINUED | OUTPATIENT
Start: 2022-09-12 | End: 2022-09-12 | Stop reason: HOSPADM

## 2022-09-12 RX ORDER — ACETAMINOPHEN 500 MG
500 TABLET ORAL EVERY 6 HOURS PRN
Qty: 30 TABLET | Refills: 0 | Status: SHIPPED | OUTPATIENT
Start: 2022-09-12

## 2022-09-12 RX ORDER — HYDROMORPHONE HCL 110MG/55ML
0.25 PATIENT CONTROLLED ANALGESIA SYRINGE INTRAVENOUS EVERY 5 MIN PRN
Status: DISCONTINUED | OUTPATIENT
Start: 2022-09-12 | End: 2022-09-12 | Stop reason: HOSPADM

## 2022-09-12 RX ORDER — HYDROMORPHONE HCL 110MG/55ML
0.5 PATIENT CONTROLLED ANALGESIA SYRINGE INTRAVENOUS EVERY 5 MIN PRN
Status: DISCONTINUED | OUTPATIENT
Start: 2022-09-12 | End: 2022-09-12 | Stop reason: HOSPADM

## 2022-09-12 RX ORDER — SODIUM CHLORIDE 9 MG/ML
INJECTION, SOLUTION INTRAVENOUS CONTINUOUS
Status: DISCONTINUED | OUTPATIENT
Start: 2022-09-12 | End: 2022-09-12 | Stop reason: HOSPADM

## 2022-09-12 RX ORDER — ACETAMINOPHEN 325 MG/1
TABLET ORAL
Status: COMPLETED
Start: 2022-09-12 | End: 2022-09-12

## 2022-09-12 RX ORDER — OXYCODONE HYDROCHLORIDE 5 MG/1
5 TABLET ORAL
Status: DISCONTINUED | OUTPATIENT
Start: 2022-09-12 | End: 2022-09-12 | Stop reason: HOSPADM

## 2022-09-12 RX ORDER — SUCCINYLCHOLINE/SOD CL,ISO/PF 200MG/10ML
SYRINGE (ML) INTRAVENOUS PRN
Status: DISCONTINUED | OUTPATIENT
Start: 2022-09-12 | End: 2022-09-12 | Stop reason: SDUPTHER

## 2022-09-12 RX ORDER — ACETAMINOPHEN 325 MG/1
650 TABLET ORAL ONCE
Status: COMPLETED | OUTPATIENT
Start: 2022-09-12 | End: 2022-09-12

## 2022-09-12 RX ORDER — PROPOFOL 10 MG/ML
INJECTION, EMULSION INTRAVENOUS PRN
Status: DISCONTINUED | OUTPATIENT
Start: 2022-09-12 | End: 2022-09-12 | Stop reason: SDUPTHER

## 2022-09-12 RX ORDER — LIDOCAINE HYDROCHLORIDE 20 MG/ML
INJECTION, SOLUTION EPIDURAL; INFILTRATION; INTRACAUDAL; PERINEURAL PRN
Status: DISCONTINUED | OUTPATIENT
Start: 2022-09-12 | End: 2022-09-12 | Stop reason: SDUPTHER

## 2022-09-12 RX ORDER — DEXAMETHASONE SODIUM PHOSPHATE 4 MG/ML
INJECTION, SOLUTION INTRA-ARTICULAR; INTRALESIONAL; INTRAMUSCULAR; INTRAVENOUS; SOFT TISSUE PRN
Status: DISCONTINUED | OUTPATIENT
Start: 2022-09-12 | End: 2022-09-12 | Stop reason: SDUPTHER

## 2022-09-12 RX ORDER — LIDOCAINE HYDROCHLORIDE 10 MG/ML
0.5 INJECTION, SOLUTION EPIDURAL; INFILTRATION; INTRACAUDAL; PERINEURAL ONCE
Status: DISCONTINUED | OUTPATIENT
Start: 2022-09-12 | End: 2022-09-12 | Stop reason: HOSPADM

## 2022-09-12 RX ORDER — MAGNESIUM HYDROXIDE 1200 MG/15ML
LIQUID ORAL CONTINUOUS PRN
Status: COMPLETED | OUTPATIENT
Start: 2022-09-12 | End: 2022-09-12

## 2022-09-12 RX ORDER — ONDANSETRON 2 MG/ML
INJECTION INTRAMUSCULAR; INTRAVENOUS PRN
Status: DISCONTINUED | OUTPATIENT
Start: 2022-09-12 | End: 2022-09-12 | Stop reason: SDUPTHER

## 2022-09-12 RX ORDER — ONDANSETRON 2 MG/ML
4 INJECTION INTRAMUSCULAR; INTRAVENOUS
Status: DISCONTINUED | OUTPATIENT
Start: 2022-09-12 | End: 2022-09-12 | Stop reason: HOSPADM

## 2022-09-12 RX ORDER — ROCURONIUM BROMIDE 10 MG/ML
INJECTION, SOLUTION INTRAVENOUS PRN
Status: DISCONTINUED | OUTPATIENT
Start: 2022-09-12 | End: 2022-09-12 | Stop reason: SDUPTHER

## 2022-09-12 RX ORDER — PHENYLEPHRINE HYDROCHLORIDE 10 MG/ML
INJECTION INTRAVENOUS PRN
Status: DISCONTINUED | OUTPATIENT
Start: 2022-09-12 | End: 2022-09-12 | Stop reason: SDUPTHER

## 2022-09-12 RX ORDER — SODIUM CHLORIDE, SODIUM LACTATE, POTASSIUM CHLORIDE, CALCIUM CHLORIDE 600; 310; 30; 20 MG/100ML; MG/100ML; MG/100ML; MG/100ML
INJECTION, SOLUTION INTRAVENOUS CONTINUOUS
Status: DISCONTINUED | OUTPATIENT
Start: 2022-09-12 | End: 2022-09-12 | Stop reason: HOSPADM

## 2022-09-12 RX ORDER — APIXABAN 2.5 MG/1
2.5 TABLET, FILM COATED ORAL 2 TIMES DAILY
Qty: 60 TABLET | Refills: 0
Start: 2022-09-13

## 2022-09-12 RX ORDER — FENTANYL CITRATE 50 UG/ML
INJECTION, SOLUTION INTRAMUSCULAR; INTRAVENOUS PRN
Status: DISCONTINUED | OUTPATIENT
Start: 2022-09-12 | End: 2022-09-12 | Stop reason: SDUPTHER

## 2022-09-12 RX ORDER — MIDAZOLAM HYDROCHLORIDE 1 MG/ML
INJECTION INTRAMUSCULAR; INTRAVENOUS PRN
Status: DISCONTINUED | OUTPATIENT
Start: 2022-09-12 | End: 2022-09-12 | Stop reason: SDUPTHER

## 2022-09-12 RX ADMIN — PHENYLEPHRINE HYDROCHLORIDE 100 MCG: 10 INJECTION INTRAVENOUS at 08:48

## 2022-09-12 RX ADMIN — Medication 160 MG: at 08:33

## 2022-09-12 RX ADMIN — HYDROMORPHONE HYDROCHLORIDE 0.5 MG: 2 INJECTION, SOLUTION INTRAMUSCULAR; INTRAVENOUS; SUBCUTANEOUS at 10:16

## 2022-09-12 RX ADMIN — PROPOFOL 200 MG: 10 INJECTION, EMULSION INTRAVENOUS at 08:33

## 2022-09-12 RX ADMIN — FENTANYL CITRATE 50 MCG: 50 INJECTION, SOLUTION INTRAMUSCULAR; INTRAVENOUS at 08:33

## 2022-09-12 RX ADMIN — PHENYLEPHRINE HYDROCHLORIDE 100 MCG: 10 INJECTION INTRAVENOUS at 08:51

## 2022-09-12 RX ADMIN — LIDOCAINE HYDROCHLORIDE 100 MG: 20 INJECTION, SOLUTION EPIDURAL; INFILTRATION; INTRACAUDAL; PERINEURAL at 08:33

## 2022-09-12 RX ADMIN — DEXAMETHASONE SODIUM PHOSPHATE 4 MG: 4 INJECTION, SOLUTION INTRAMUSCULAR; INTRAVENOUS at 08:40

## 2022-09-12 RX ADMIN — ONDANSETRON 4 MG: 2 INJECTION INTRAMUSCULAR; INTRAVENOUS at 08:40

## 2022-09-12 RX ADMIN — PHENYLEPHRINE HYDROCHLORIDE 100 MCG: 10 INJECTION INTRAVENOUS at 09:04

## 2022-09-12 RX ADMIN — FENTANYL CITRATE 50 MCG: 50 INJECTION, SOLUTION INTRAMUSCULAR; INTRAVENOUS at 09:29

## 2022-09-12 RX ADMIN — ACETAMINOPHEN 650 MG: 325 TABLET ORAL at 07:20

## 2022-09-12 RX ADMIN — SODIUM CHLORIDE, POTASSIUM CHLORIDE, SODIUM LACTATE AND CALCIUM CHLORIDE: 600; 310; 30; 20 INJECTION, SOLUTION INTRAVENOUS at 07:20

## 2022-09-12 RX ADMIN — ROCURONIUM BROMIDE 30 MG: 10 INJECTION, SOLUTION INTRAVENOUS at 08:38

## 2022-09-12 RX ADMIN — HYDROMORPHONE HYDROCHLORIDE 0.5 MG: 2 INJECTION, SOLUTION INTRAMUSCULAR; INTRAVENOUS; SUBCUTANEOUS at 10:11

## 2022-09-12 RX ADMIN — SUGAMMADEX 200 MG: 100 INJECTION, SOLUTION INTRAVENOUS at 09:23

## 2022-09-12 RX ADMIN — PHENYLEPHRINE HYDROCHLORIDE 100 MCG: 10 INJECTION INTRAVENOUS at 08:54

## 2022-09-12 RX ADMIN — MIDAZOLAM 2 MG: 1 INJECTION INTRAMUSCULAR; INTRAVENOUS at 08:27

## 2022-09-12 RX ADMIN — PHENYLEPHRINE HYDROCHLORIDE 100 MCG: 10 INJECTION INTRAVENOUS at 08:58

## 2022-09-12 ASSESSMENT — PAIN SCALES - GENERAL
PAINLEVEL_OUTOF10: 0
PAINLEVEL_OUTOF10: 7
PAINLEVEL_OUTOF10: 2
PAINLEVEL_OUTOF10: 7
PAINLEVEL_OUTOF10: 0

## 2022-09-12 ASSESSMENT — PAIN DESCRIPTION - DESCRIPTORS: DESCRIPTORS: CRAMPING

## 2022-09-12 ASSESSMENT — PAIN DESCRIPTION - LOCATION
LOCATION: LEG
LOCATION: PELVIS

## 2022-09-12 ASSESSMENT — PAIN DESCRIPTION - ORIENTATION
ORIENTATION: RIGHT
ORIENTATION: LOWER

## 2022-09-12 ASSESSMENT — PAIN - FUNCTIONAL ASSESSMENT: PAIN_FUNCTIONAL_ASSESSMENT: 0-10

## 2022-09-12 ASSESSMENT — PAIN DESCRIPTION - PAIN TYPE: TYPE: SURGICAL PAIN

## 2022-09-12 NOTE — OP NOTE
Operative Note      Patient: Iesha Jaramillo  YOB: 1955  MRN: [id number]    Date of Procedure: 9/12/2022    Pre-Op Diagnosis: Postmenopausal bleeding [N95.0]    Post-Op Diagnosis: Same       Procedure(s): HYSTEROSCOPY DILATATION AND CURETTAGE -Zabrina Jimenez; PAP SMEAR    Surgeon(s):  Jessica Mendenhall MD    Assistant:   Surgical Assistant: Saulo Cortes    Anesthesia: General    Estimated Blood Loss (mL): Minimal    Complications: None    Specimens:   ID Type Source Tests Collected by Time Destination   A : A) ENDOCERVICAL CURETTINGS Tissue Tissue SURGICAL PATHOLOGY Jessica Mendenhall MD 9/12/2022 0855    B : B)  UTERINE MASS Tissue Tissue SURGICAL PATHOLOGY Jessica Mendenhall MD 9/12/2022 5299        Implants:  * No implants in log *      Drains: * No LDAs found *    Findings: large submucosal fibroid incompletely resected. Indication for procedure:  61 y.o. with poor mobility and postmenopausal bleeding for evaluation. I have discussed the risk and benefits of this surgery/procedure and the alternatives with the patient. All questions answered to their satisfaction. Detailed Description of Procedure: The patient was taken to the operating room. She was placed in a dorsal supine position with sequential compression devices on prior to the administration of anesthesia. Preoperative antibiotics were infused, and general endotracheal anesthesia was induced without difficulty. Legs were then elevated into Henry stirrups. We prepped and draped the patient in the usual sterile fashion and performed an in and out catheterization. A sterile speculum was placed. There cervix was visualized, a pap smear was performed. The anterior lip of the cervix was grasped with a long Allis. An endocervical curettage was performed. The cervical canal was dilated to a 18 Guyanese size. The myosure device was primed and inserted. The device was used to remove the fibroid and endometrial tissue.  The fibroid cannot be fully resected at this time and will need repeat hysteroscopic resection. Specimens were sent to pathology and all instruments were removed. Counts were correct x 2. The patient was taken to the PACU in stable condition.       Electronically signed by Vickie Quiroz MD on 9/12/2022 at 9:41 AM

## 2022-09-12 NOTE — H&P
Patient Name: Nydia Xiong  Patient : 1955  Patient MRN: [id number]    Primary Oncologist: Aristides Agrawal (Medical Oncology)  Referring Physician: Abiola Baca MD  OBGYN Physician: none  Referring to GYN Oncology: Dr. Sophie Alejandre  PCP: CNP [facility]    Reason for Consult     Postmenopausal bleeding    Chief Complaint  Post menopausal bleeding    HPI  She was [age] years old when she started her menstrual cycle. She can not recall her last menstrual cycle. Patient has been referred by Dr. Sophie Alejandre for evaluation of postmenopausal bleeding. Patient initiated care with him May 19, 2022 for consideration of long-term use of anticoagulants. In 2018 she had an unprovoked pulmonary embolus with no DVTs. This was felt to be related to her Immobility, chronic knee pain, obesity, hypertension and persistent pedal edema. She did report to him that she had had vaginal bleeding for approximately 1 month, so has been asked to see me for further evaluation.    Previous Therapies    Problem List  Post menopausal bleeding  Cancer cervix screening status (finding)  DVT - Deep vein thrombosis  Essential HTN  Former smoker  History of pulmonary embolus (situation)  Limited mobility  Long term current use of anticoagulants  Obesity - Body mass index 40+  Pedal edema  Viral screening status (finding)    Past Medical History  See problem list and HPI  Surgical History  Procedure: Repair of patella (procedure)  Procedure: Ligation of bilateral fallopian tubes (procedure)  , Procedure: Dilatation and curettage: routine (procedure)    OB/GYN History  Menses Details: Age of First Menses: 14; ; Pregnancy Details: Currently Pregnant: No; Desire for Fertility: No; : 5; Para: 4; SAB: 1; ; Menopause Information: Postmenopausal; Reason: Natural; Symptoms: No Symptoms,Hot flashes; ; OB/GYN Medication Hx: Contraceptive Hormonal Use: No; Post-Menopausal Hormonal Therapy: No; IUD: No; Other Contraceptive Hx: No;    Allergies  Vicodin  lisinopril    Medications  Naproxen Oral 500 mg tablet  Eliquis (Apixaban Oral) 2.5 mg tablet 1 tablets,dose pack orally 2 times per day. Zinc Oral  Aspirin Oral 81 mg capsule  Probiotics Oral  Vitamin C (Ascorbic Acid Oral)  Lasix (Furosemide Oral) 20 mg tablet  Tylenol (Acetaminophen Oral) 500 mg capsule  Cozaar (Losartan Oral) 50 mg tablet oral daily    Social History  If Applicable:   Smoking History: Never vaped  Tobacco Use: Former smoker  Alcohol use:  Denies  Illicit Drug Use: Denies    Family/Home Dynamics: , lives alone  Occupation notes: retired  Socioeconomic notes:  Pregnancy Status (if applicable): Discussed current status and indications for screening with patient  Fertility Risks: Risks were discussed (if applicable) pertaining to potential treatment impacts on patient fertility. Family History  Father: Coronary artery disease, Hypertension  Sister 1: Coagulopathies  Uncle - Paternal (2nd Degree): Coronary artery disease  Aunt - Paternal (2nd Degree): Coronary artery disease  Aunt - Maternal (2nd Degree): Brain/CNS cancer      Additional information, if applicable:     Health Maintenance  Mammogram - Screening (bilateral) on 1998  Pap Smear on 1998          Review of Systems    Constitutional:  No weight loss, No fever, No chills, No night sweats. Energy level good. Eyes:  No diplopia, No transient or permanent loss of vision, No scotomata. ENT / Mouth:  No epistaxis, No dysphagia, No hoarseness, No oral ulcers, No gingival bleeding. No sore throat, No postnasal drip, No nasal drip, No mouth pain, No sinus pain, No tinnitus, Normal hearing. Cardiovascular:  No chest pain, No palpitations, No syncope, No upper extremity edema, No lower extremity edema, No calf discomfort. Respiratory:  No cough. No hemoptysis, No pleurisy, No wheezing, No dyspnea.   Breast:  No breast mass, No pain, No nipple discharge, No change in size, No change in shape.  Gastrointestinal:  No abdominal pain, No abdominal cramping, No nausea, No vomiting, No constipation, No diarrhea, No hematochezia, No melena, No jaundice, No dyspepsia, No dysphagia. Urinary:  No dysuria, No hematuria, No urinary incontinence. Genitalia: No discharge, No suprapubic pain, Positive abnormal bleeding. Musculoskeletal:  No muscle pain, No swollen joints, No joint redness, No bone pain, No spine tenderness. Has ongoing knee pain, which limits mobility  Skin:  No rash, No nodules, No pruritus, No lesions. Neurologic:  No confusion, No seizures, No syncope, No tremor, No speech change, No headache, No hiccups, No abnormal gait, No sensory changes, No weakness. Psychiatric:  No depression, No anxiety, Concentration normal.  Endocrine:  No polyuria, No polydipsia, No hot flashes, No thyroid symptoms. Hematologic:  No epistaxis, No gingival bleeding, No petechiae, No ecchymosis. Lymphatic:  No lymphadenopathy, Bilateral pedal lymphedema. Allergy / Immunologic:  No eczema, No frequent mucous infections, No frequent respiratory infections, No recurrent urticarial, No frequent skin infections.       Vital Signs  Blood pressure: 128/82, Pulse: 72, Temperature: 98.1 F, Respirations: , O2 sat: , Pain Scale: 0, Height: 66 in, Weight: 291 lb, BSA: 2.48, BMI: 46.97 kg/m2    Physical Exam     CONSTITUTIONAL: awake, alert, cooperative, no apparent distress   EYES: pupils equal, round and reactive to light, sclera clear and conjunctiva normal  ENT: Normocephalic, without obvious abnormality, atraumatic  NECK: supple, symmetrical  HEMATOLOGIC/LYMPHATIC: no cervical, supraclavicular or inguinal lymphadenopathy   LUNGS: no increased work of breathing and clear to auscultation   CARDIOVASCULAR: regular rate and rhythm, normal S1 and S2, no murmur noted  ABDOMEN: soft, non-distended, non-tender, no masses palpated, no hepatosplenomegaly   MUSCULOSKELETAL: full range of motion noted, tone is normal  NEUROLOGIC: awake, alert, oriented to name, place and time. Motor skills grossly intact. SKIN: Normal skin color, texture, turgor and no jaundice. appears intact   EXTREMITIES: Without cyanosis, clubbing, edema or asymmetry   BREAST: Deferred. GYNECOLOGIC: Deferred. RECTAL: Deferred. Labs                             Imaging    Bilateral Dopplers May 26, 2022  No DVTs    Bilateral Dopplers 12/26/2018 no DVTs    Pathology      OCM - Patient Care Management    Pain, if applicable:        Performance Status: ECOG 0 Normal activity. Fully active, able to carry on all pre-disease performance without restriction. (Date: 06/08/2022)     Depression Status:        Psycho-social PHQ-9 Follow-up Plan (if applicable):     Assessment & Plan    1. Postmenopausal bleeding  Discussed endometrial biopsy with the patient. Discussed the results of the ultrasound, which showed fibroids. The patient is planned to have a hysteroscopy D&C/myosure. The risks, benefits, and alternatives have been discussed including but not limited to: infection, pain, bleeding, damage to surrounding structures including but not limited to blood vessels, nerves, bladder, bowel, ureters, need for other procedures, risk of developing blood clots and risks of anesthesia. All questions have been answered and consents will be signed on the day of the procedure. 2. History of unprovoked PE December 2018  Lifetime anticoagulation has been advised  I will ask Dr. Sathish Rodriguez to manage patient's anticoagulation prior to her surgery. 3. Mobility limited due to knee pain, pedal edema, hypertension and BMI of 47    4. Not up-to-date on health maintenance  Pap with HPV today  Mammogram advised  Colonoscopy advised  DEXA scan encouraged    Discuss ordering these at next visit    Patient needs medical clearance for surgery: yes    Time-Based Itemization  . I have recommended that the patient follow CDC guidelines for prevention of COVID-19 infection.       Rayne FONG Regina Cramer MD  [facility], Gynecologic Oncology  Phone: [phone]  Fax: [phone]  Online: [email or web address]     Note Recipients:  Guy Suarez MD (Referring)  Michelle Gomez MD

## 2022-09-12 NOTE — H&P
Date of Surgery Update:  Chris Pierre was seen, history and physical examination reviewed, and patient examined by me today. There have been no significant clinical changes since the completion of the previous history and physical.    The risk, benefits, and alternatives of the proposed procedure have been explained to the patient (or appropriate guardian) and understanding verbalized. All questions answered. Patient wishes to proceed.     Electronically signed by: Javi De Souza MD,9/12/2022,8:24 AM

## 2022-09-12 NOTE — ANESTHESIA PRE PROCEDURE
Department of Anesthesiology  Preprocedure Note       Name:  Evelia Stone   Age:  61 y.o.  :  1955                                          MRN:  [id number]         Date:  2022      Surgeon: Robert Lanier):  Catalina Villagran MD    Procedure: Procedure(s): HYSTEROSCOPY DILATATION AND CURETTAGE -Yonatanel Eitan; PAP SMEAR    Medications prior to admission:   Prior to Admission medications    Medication Sig Start Date End Date Taking? Authorizing Provider   ELIQUIS 2.5 MG TABS tablet  22   Historical Provider, MD   atorvastatin (LIPITOR) 20 MG tablet  22   Historical Provider, MD   furosemide (LASIX) 20 MG tablet  22   Historical Provider, MD   losartan (COZAAR) 50 mg tablet Losartan Oral  oral   daily     active    Historical Provider, MD       Current medications:    Current Facility-Administered Medications   Medication Dose Route Frequency Provider Last Rate Last Admin    lactated ringers infusion   IntraVENous Continuous Catalina Villagran MD        lidocaine PF 1 % injection 0.5 mL  0.5 mL IntraDERmal Once Catalina Villagran MD           Allergies: Allergies   Allergen Reactions    Lisinopril      cough       Problem List:    Patient Active Problem List   Diagnosis Code    Pulmonary embolism and infarction ([facility] [address] I26.99    Pneumonia due to organism J18.9    Acute respiratory failure with hypoxia ([facility] [address] J96.01    Bilateral pulmonary embolism ([facility] [address] I26.99       Past Medical History:        Diagnosis Date    Hx of blood clots     PE    Hypertension        Past Surgical History:        Procedure Laterality Date    PATELLA SURGERY Right     TUBAL LIGATION         Social History:    Social History     Tobacco Use    Smoking status: Former     Packs/day: 2.00     Years: 30.00     Pack years: 60.00     Types: Cigarettes     Quit date: 2013     Years since quittin.7    Smokeless tobacco: Never   Substance Use Topics    Alcohol use:  No                                Counseling given: Not Answered      Vital Signs (Current): There were no vitals filed for this visit. BP Readings from Last 3 Encounters:   01/09/20 122/80   04/18/19 123/71   01/18/19 115/70       NPO Status:                                                                                 BMI:   Wt Readings from Last 3 Encounters:   06/13/22 291 lb (132 kg)   01/09/20 249 lb (112.9 kg)   04/18/19 244 lb (110.7 kg)     There is no height or weight on file to calculate BMI.    CBC:   Lab Results   Component Value Date/Time    WBC 4.6 06/10/2022 02:40 PM    RBC 4.24 06/10/2022 02:40 PM    HGB 13.0 06/10/2022 02:40 PM    HCT 38.8 06/10/2022 02:40 PM    MCV 91.4 06/10/2022 02:40 PM    RDW 14.6 06/10/2022 02:40 PM     06/10/2022 02:40 PM       CMP:   Lab Results   Component Value Date/Time     06/10/2022 02:40 PM    K 4.1 06/10/2022 02:40 PM    K 4.3 12/24/2018 07:08 PM     06/10/2022 02:40 PM    CO2 20 06/10/2022 02:40 PM    BUN 19 06/10/2022 02:40 PM    CREATININE 0.9 06/10/2022 02:40 PM    GFRAA >60 06/10/2022 02:40 PM    AGRATIO 1.0 12/24/2018 07:08 PM    LABGLOM >60 06/10/2022 02:40 PM    GLUCOSE 96 06/10/2022 02:40 PM    PROT 8.5 12/24/2018 07:08 PM    CALCIUM 9.8 06/10/2022 02:40 PM    BILITOT 0.3 12/24/2018 07:08 PM    ALKPHOS 75 12/24/2018 07:08 PM    AST 21 12/24/2018 07:08 PM    ALT 15 12/24/2018 07:08 PM       POC Tests: No results for input(s): POCGLU, POCNA, POCK, POCCL, POCBUN, POCHEMO, POCHCT in the last 72 hours.     Coags:   Lab Results   Component Value Date/Time    PROTIME 13.9 12/26/2018 04:14 AM    INR 1.22 12/26/2018 04:14 AM    APTT 43.3 12/27/2018 02:05 AM       HCG (If Applicable): No results found for: PREGTESTUR, PREGSERUM, HCG, HCGQUANT     ABGs:   Lab Results   Component Value Date/Time    PHART 7.371 12/24/2018 08:05 PM    PO2ART 114.0 12/24/2018 08:05 PM    RNO3XEO 44.7 12/24/2018 08:05 PM    BLE4BSN 25.9 12/24/2018 08:05 PM    BEART 0.2 12/24/2018 08:05 PM    N8LEIYKK 98.5 12/24/2018 08:05 PM        Type & Screen (If Applicable):  No results found for: LABABO, LABRH    Drug/Infectious Status (If Applicable):  No results found for: HIV, HEPCAB    COVID-19 Screening (If Applicable): No results found for: COVID19        Anesthesia Evaluation  Patient summary reviewed and Nursing notes reviewed no history of anesthetic complications:   Airway: Mallampati: III  TM distance: >3 FB   Neck ROM: full  Mouth opening: > = 3 FB   Dental: normal exam     Comment: Missing many molars    Pulmonary:                             ROS comment: Former smoker   Cardiovascular:    (+) hypertension:,     (-) CABG/stent, dysrhythmias and  angina      Rhythm: regular  Rate: normal                 ROS comment: Transthoracic Echocardiography Report (TTE)      Demographics      Patient Name       Javi Grate      Date of Study      12/25/2018         Gender              Female      Patient Number     [id number]         Date of Birth       1955      Visit Number       [id number]          Age                 66 year(s)      Accession Number   [id number]          Room Number         9033      Corporate ID       [id number]           [place], [place]      Ordering Physician Paty Chirinos,          Interpreting        Gemini Hdz, MD   Physician           MD, [facility]     Procedure     Type of Study      TTE procedure:ECHOCARDIOGRAM COMPLETE 2D W DOPPLER W COLOR. Procedure Date  Date: 12/25/2018 Start: 09:52 AM     Study Location: Portable  Technical Quality: Adequate visualization     Indications:Pulmonary embolus.     Patient Status: STAT     Height: 67 inches Weight: 231.01 pounds BSA: 2.15 m2 BMI: 36.18 kg/m2     HR: 90 bpm BP: 137/83 mmHg      Conclusions      Summary   Small left ventricle size, with severe left ventricular hypertrophy.    Hyperdynamic systolic function with an estimated ejection fraction of 65%. No regional wall motion abnormality. Grade I diastolic function. The right ventricle is normal in size and function. The tricuspid valve is normal in structure. Mild tricuspid regurgitation with a RVSP of 42 mmHg. Signature      ------------------------------------------------------------------   Electronically signed by Orlena Duverney, MD, [address] [facility] (Interpreting   physician) on 12/25/2018 at 12:29 PM     Neuro/Psych:      (-) seizures, TIA and CVA           GI/Hepatic/Renal:        (-) GERD      ROS comment: No n/v today. Endo/Other:    (+) blood dyscrasia: anticoagulation therapy:., .                 Abdominal:   (+) obese,           Vascular:   + DVT, PE (5 yrs ago). Other Findings:           Anesthesia Plan      general     ASA 3     (OETT)  Induction: intravenous. MIPS: Postoperative opioids intended and Prophylactic antiemetics administered. Anesthetic plan and risks discussed with patient. Plan discussed with CRNA.                     Beba Isbell MD   9/12/2022

## 2022-09-12 NOTE — DISCHARGE INSTRUCTIONS
Hysteroscopy With Dilation and Curettage: What to Expect at [address] [facility]     For a hysteroscopy, your doctor guides a lighted tube through your cervix andinto your uterus. This helps the doctor see inside your uterus. For a dilation and curettage (D&C), your doctor uses a curved tool, called acurette, to gently scrape tissue from your uterus. After these procedures, you are likely to have a backache or cramps similar to menstrual cramps. Expect to pass small clots of blood from your vagina for the first few days. You may have light vaginal bleeding for several weeks after theD&C. If the doctor filled your uterus with air, your belly may feel full. You mayalso have shoulder pain right after the procedure. You will probably be able to go back to most of your normal activities in 1 or2 days. This care sheet gives you a general idea about how long it will take for you to recover. But each person recovers at a different pace. Follow the steps belowto get better as quickly as possible. How can you care for yourself at home? Activity    Rest when you feel tired. You will probably be able to return to work the day after the procedure. But it depends on what was done and the type of work you do. You may have some light vaginal bleeding. Use sanitary pads until you stop bleeding. Using pads makes it easier to monitor your bleeding. Do not rinse your vagina with fluid (douche). Ask your doctor when it is okay for you to have sex. Diet    You can eat your normal diet. If your stomach is upset, try bland, low-fat foods like plain rice, broiled chicken, toast, and yogurt. Drink plenty of fluids (unless your doctor tells you not to). Medicines    Your doctor will tell you if and when you can restart your medicines. You will also get instructions about taking any new medicines. If you take aspirin or some other blood thinner, ask your doctor if and when to start taking it again.  Make sure

## 2022-09-12 NOTE — ANESTHESIA POSTPROCEDURE EVALUATION
Department of Anesthesiology  Postprocedure Note    Patient: Emmanuel Salter  MRN: [id number]  YOB: 1955  Date of evaluation: 9/12/2022      Procedure Summary     Date: 09/12/22 Room / Location: [facility] OR [address] [place] [address]    Anesthesia Start: 3732 Anesthesia Stop: 5766    Procedure: HYSTEROSCOPY DILATATION AND CURETTAGE -Tenna Moose; PAP SMEAR (Vagina ) Diagnosis:       Postmenopausal bleeding      (Postmenopausal bleeding [N95.0])    Surgeons: Jasiel Villa MD Responsible Provider: Kathie Matute MD    Anesthesia Type: general ASA Status: 3          Anesthesia Type: No value filed.     Ender Phase I: Ender Score: 9    Ender Phase II: Ender Score: 10      Anesthesia Post Evaluation    Patient location during evaluation: PACU  Patient participation: complete - patient participated  Level of consciousness: awake  Airway patency: patent  Nausea & Vomiting: no vomiting  Complications: no  Cardiovascular status: hemodynamically stable  Respiratory status: acceptable  Hydration status: euvolemic  Multimodal analgesia pain management approach

## 2022-09-13 LAB — URINE CULTURE, ROUTINE: NORMAL

## 2022-09-14 LAB
HPV COMMENT: ABNORMAL
HPV TYPE 16: NOT DETECTED
HPV TYPE 18: NOT DETECTED
HPVOH (OTHER TYPES): DETECTED

## 2023-02-03 NOTE — PROGRESS NOTES
Name_______________________________________Printed:____________________  Date and time of surgery___2/13/23 @ 0830_____________________Arrival Time:___0700 main hosp_____________   1. The instructions given regarding when and if a patient needs to stop oral intake prior to surgery varies. Follow the specific instructions you were given                  _x__Nothing to eat or to drink after Midnight the night before.                   ____Carbo loading or instructions will be given to select patients-if you have been given those instructions -please do the following                           The evening before your surgery after dinner before midnight drink 40 ounces of gatorade. If you are diabetic use sugar free. The morning of surgery drink 40 ounces of water. This needs to be finished 3 hours prior to your surgery start time. 2. Take the following pills with a small sip of water on the morning of surgery_______________none____________________________________                  Do not take blood pressure medications ending in pril or sartan the thomas prior to surgery or the morning of surgery. Dr Jumana Garza patient are not to take any medications the AM of surgery. 3. Aspirin, Ibuprofen, Advil, Naproxen, Vitamin E and other Anti-inflammatory products and supplements should be stopped for 5 -7days before surgery or as directed by your physician. 4. Check with your Doctor regarding stopping Plavix, Coumadin,Eliquis, Lovenox,Effient,Pradaxa,Xarelto, Fragmin or other blood thinners and follow their instructions. 5. Do not smoke, and do not drink any alcoholic beverages 24 hours prior to surgery. This includes NA Beer. Refrain from the usage of any recreational drugs. 6. You may brush your teeth and gargle the morning of surgery. DO NOT SWALLOW WATER   7. You MUST make arrangements for a responsible adult to stay on site while you are here and take you home after your surgery.  You will not be allowed to leave alone or drive yourself home. It is strongly suggested someone stay with you the first 24 hrs. Your surgery will be cancelled if you do not have a ride home. 8. A parent/legal guardian must accompany a child scheduled for surgery and plan to stay at the hospital until the child is discharged. Please do not bring other children with you. 9. Please wear simple, loose fitting clothing to the hospital.  Kristeen Cava not bring valuables (money, credit cards, checkbooks, etc.) Do not wear any makeup (including no eye makeup) or nail polish on your fingers or toes. 10. DO NOT wear any jewelry or piercings on day of surgery. All body piercing jewelry must be removed. 11. If you have ___dentures, they will be removed before going to the OR; we will provide you a container. If you wear ___contact lenses or ___glasses, they will be removed; please bring a case for them. 12. Please see your family doctor/pediatrician for a history & physical and/or concerning medications. Bring any test results/reports from your physician's office. PCP___wrenn_______________Phone___________H&P Appt. Date________             13 If you  have a Living Will and Durable Power of  for Healthcare, please bring in a copy. 15. Notify your Surgeon if you develop any illness between now and surgery  time, cough, cold, fever, sore throat, nausea, vomiting, etc.  Please notify your surgeon if you experience dizziness, shortness of breath or blurred vision between now & the time of your surgery             15. DO NOT shave your operative site 96 hours prior to surgery. For face & neck surgery, men may use an electric razor 48 hours prior to surgery. 16. Shower the night before or morning of surgery using an antibacterial soap or as you have been instructed. 17. To provide excellent care visitors will be limited to one in the room at any given time.              18.  Please bring picture ID and insurance card. 19.  Visit our web site for additional information:  [email or web address]              20.During flu season no children under the age of 15 are permitted in the hospital for the safety of all patients. 21. If you take a long acting insulin in the evening only  take half of your usual  dose the night  before your procedure              22. If you use a c-pap please bring DOS if staying overnight,             23.For your convenience [facility] has a pharmacy on site to fill your prescriptions. 24. If you use oxygen and have a portable tank please bring it  with you the DOS             25. Bring a complete list of all your medications with name and dose include any supplements. 26. Other__________________________________________   *Please call pre admission testing if you any further questions   [facility]   [address] [place] [address]  [phone]   [address] [place] [address]       VISITOR POLICY(subject to change)    Current policy is 2 visitors per patient. No children. Mask is  at the discretion of the facility. Visiting hours are 8a-8p. Overnight visitors will be at the discretion of the nurse. All policies subject to change. All above information reviewed with patient in person or by phone. Patient verbalizes understanding. All questions and concerns addressed.                                                                                                  Patient/Rep______pt______________                                                                                                                                    PRE OP INSTRUCTIONS

## 2023-02-09 ENCOUNTER — HOSPITAL ENCOUNTER (OUTPATIENT)
Age: 68
Discharge: HOME | End: 2023-02-09
Payer: MEDICARE

## 2023-02-09 DIAGNOSIS — Z01.818 PREOP TESTING: ICD-10-CM

## 2023-02-09 LAB
ANION GAP SERPL CALCULATED.3IONS-SCNC: 11 MMOL/L (ref 3–16)
BACTERIA: ABNORMAL /HPF
BASOPHILS ABSOLUTE: 0 K/UL (ref 0–0.2)
BASOPHILS RELATIVE PERCENT: 0.8 %
BILIRUBIN URINE: NEGATIVE
BLOOD, URINE: ABNORMAL
BUN BLDV-MCNC: 21 MG/DL (ref 7–20)
CALCIUM SERPL-MCNC: 9.7 MG/DL (ref 8.3–10.6)
CHLORIDE BLD-SCNC: 109 MMOL/L (ref 99–110)
CLARITY: CLEAR
CO2: 25 MMOL/L (ref 21–32)
COLOR: YELLOW
CREAT SERPL-MCNC: 1.1 MG/DL (ref 0.6–1.2)
EOSINOPHILS ABSOLUTE: 0.2 K/UL (ref 0–0.6)
EOSINOPHILS RELATIVE PERCENT: 4.1 %
EPITHELIAL CELLS, UA: 2 /HPF (ref 0–5)
GFR SERPL CREATININE-BSD FRML MDRD: 56 ML/MIN/{1.73_M2}
GLUCOSE BLD-MCNC: 90 MG/DL (ref 70–99)
GLUCOSE URINE: NEGATIVE MG/DL
HCT VFR BLD CALC: 33.9 % (ref 36–48)
HEMOGLOBIN: 11 G/DL (ref 12–16)
HYALINE CASTS: 1 /LPF (ref 0–8)
KETONES, URINE: ABNORMAL MG/DL
LEUKOCYTE ESTERASE, URINE: ABNORMAL
LYMPHOCYTES ABSOLUTE: 2.5 K/UL (ref 1–5.1)
LYMPHOCYTES RELATIVE PERCENT: 41.1 %
MCH RBC QN AUTO: 30 PG (ref 26–34)
MCHC RBC AUTO-ENTMCNC: 32.5 G/DL (ref 31–36)
MCV RBC AUTO: 92.4 FL (ref 80–100)
MICROSCOPIC EXAMINATION: YES
MONOCYTES ABSOLUTE: 0.3 K/UL (ref 0–1.3)
MONOCYTES RELATIVE PERCENT: 5.6 %
NEUTROPHILS ABSOLUTE: 2.9 K/UL (ref 1.7–7.7)
NEUTROPHILS RELATIVE PERCENT: 48.4 %
NITRITE, URINE: NEGATIVE
PDW BLD-RTO: 15.3 % (ref 12.4–15.4)
PH UA: 5 (ref 5–8)
PLATELET # BLD: 302 K/UL (ref 135–450)
PMV BLD AUTO: 9.7 FL (ref 5–10.5)
POTASSIUM SERPL-SCNC: 4.3 MMOL/L (ref 3.5–5.1)
PROTEIN UA: ABNORMAL MG/DL
RBC # BLD: 3.67 M/UL (ref 4–5.2)
RBC UA: 50 /HPF (ref 0–4)
SODIUM BLD-SCNC: 145 MMOL/L (ref 136–145)
SPECIFIC GRAVITY UA: 1.02 (ref 1–1.03)
URINE TYPE: ABNORMAL
UROBILINOGEN, URINE: 1 E.U./DL
WBC # BLD: 6 K/UL (ref 4–11)
WBC UA: 8 /HPF (ref 0–5)

## 2023-02-09 PROCEDURE — 93005 ELECTROCARDIOGRAM TRACING: CPT | Performed by: OBSTETRICS & GYNECOLOGY

## 2023-02-09 PROCEDURE — 81001 URINALYSIS AUTO W/SCOPE: CPT

## 2023-02-09 PROCEDURE — 87086 URINE CULTURE/COLONY COUNT: CPT

## 2023-02-09 PROCEDURE — 36415 COLL VENOUS BLD VENIPUNCTURE: CPT

## 2023-02-09 PROCEDURE — 80048 BASIC METABOLIC PNL TOTAL CA: CPT

## 2023-02-09 PROCEDURE — 85025 COMPLETE CBC W/AUTO DIFF WBC: CPT

## 2023-02-10 ENCOUNTER — ANESTHESIA EVENT (OUTPATIENT)
Dept: OPERATING ROOM | Age: 68
End: 2023-02-10
Payer: MEDICARE

## 2023-02-10 LAB
EKG ATRIAL RATE: 72 BPM
EKG DIAGNOSIS: NORMAL
EKG P AXIS: 49 DEGREES
EKG P-R INTERVAL: 180 MS
EKG Q-T INTERVAL: 374 MS
EKG QRS DURATION: 88 MS
EKG QTC CALCULATION (BAZETT): 409 MS
EKG R AXIS: -18 DEGREES
EKG T AXIS: 33 DEGREES
EKG VENTRICULAR RATE: 72 BPM
URINE CULTURE, ROUTINE: NORMAL

## 2023-02-13 ENCOUNTER — HOSPITAL ENCOUNTER (OUTPATIENT)
Age: 68
Setting detail: OBSERVATION
Discharge: HOME | End: 2023-02-13
Attending: OBSTETRICS & GYNECOLOGY | Admitting: OBSTETRICS & GYNECOLOGY
Payer: MEDICARE

## 2023-02-13 ENCOUNTER — ANESTHESIA (OUTPATIENT)
Dept: OPERATING ROOM | Age: 68
End: 2023-02-13
Payer: MEDICARE

## 2023-02-13 VITALS
DIASTOLIC BLOOD PRESSURE: 77 MMHG | OXYGEN SATURATION: 95 % | SYSTOLIC BLOOD PRESSURE: 122 MMHG | BODY MASS INDEX: 43.38 KG/M2 | TEMPERATURE: 97.3 F | RESPIRATION RATE: 16 BRPM | WEIGHT: 276.4 LBS | HEART RATE: 67 BPM | HEIGHT: 67 IN

## 2023-02-13 DIAGNOSIS — Z01.818 PREOP TESTING: Primary | ICD-10-CM

## 2023-02-13 PROBLEM — D25.9 FIBROID UTERUS: Status: ACTIVE | Noted: 2023-02-13

## 2023-02-13 LAB
ABO/RH: NORMAL
ANION GAP SERPL CALCULATED.3IONS-SCNC: 4 MMOL/L (ref 3–16)
ANION GAP SERPL CALCULATED.3IONS-SCNC: 7 MMOL/L (ref 3–16)
ANTIBODY SCREEN: NORMAL
BASOPHILS ABSOLUTE: 0 K/UL (ref 0–0.2)
BASOPHILS RELATIVE PERCENT: 0.1 %
BUN BLDV-MCNC: 16 MG/DL (ref 7–20)
BUN BLDV-MCNC: 17 MG/DL (ref 7–20)
CALCIUM SERPL-MCNC: 8.5 MG/DL (ref 8.3–10.6)
CALCIUM SERPL-MCNC: 8.9 MG/DL (ref 8.3–10.6)
CHLORIDE BLD-SCNC: 111 MMOL/L (ref 99–110)
CHLORIDE BLD-SCNC: 113 MMOL/L (ref 99–110)
CO2: 24 MMOL/L (ref 21–32)
CO2: 25 MMOL/L (ref 21–32)
CREAT SERPL-MCNC: 0.9 MG/DL (ref 0.6–1.2)
CREAT SERPL-MCNC: 0.9 MG/DL (ref 0.6–1.2)
EOSINOPHILS ABSOLUTE: 0 K/UL (ref 0–0.6)
EOSINOPHILS RELATIVE PERCENT: 0 %
GFR SERPL CREATININE-BSD FRML MDRD: >60 ML/MIN/{1.73_M2}
GFR SERPL CREATININE-BSD FRML MDRD: >60 ML/MIN/{1.73_M2}
GLUCOSE BLD-MCNC: 157 MG/DL (ref 70–99)
GLUCOSE BLD-MCNC: 165 MG/DL (ref 70–99)
HCT VFR BLD CALC: 28.8 % (ref 36–48)
HCT VFR BLD CALC: 30.2 % (ref 36–48)
HEMOGLOBIN: 9.2 G/DL (ref 12–16)
HEMOGLOBIN: 9.7 G/DL (ref 12–16)
LYMPHOCYTES ABSOLUTE: 0.9 K/UL (ref 1–5.1)
LYMPHOCYTES RELATIVE PERCENT: 9.8 %
MCH RBC QN AUTO: 29.5 PG (ref 26–34)
MCH RBC QN AUTO: 29.6 PG (ref 26–34)
MCHC RBC AUTO-ENTMCNC: 32 G/DL (ref 31–36)
MCHC RBC AUTO-ENTMCNC: 32.2 G/DL (ref 31–36)
MCV RBC AUTO: 91.9 FL (ref 80–100)
MCV RBC AUTO: 92.7 FL (ref 80–100)
MONOCYTES ABSOLUTE: 0.1 K/UL (ref 0–1.3)
MONOCYTES RELATIVE PERCENT: 1.3 %
NEUTROPHILS ABSOLUTE: 7.8 K/UL (ref 1.7–7.7)
NEUTROPHILS RELATIVE PERCENT: 88.8 %
PDW BLD-RTO: 14.9 % (ref 12.4–15.4)
PDW BLD-RTO: 15.4 % (ref 12.4–15.4)
PLATELET # BLD: 227 K/UL (ref 135–450)
PLATELET # BLD: 248 K/UL (ref 135–450)
PMV BLD AUTO: 9 FL (ref 5–10.5)
PMV BLD AUTO: 9.6 FL (ref 5–10.5)
POTASSIUM REFLEX MAGNESIUM: 4.5 MMOL/L (ref 3.5–5.1)
POTASSIUM SERPL-SCNC: 4.4 MMOL/L (ref 3.5–5.1)
RBC # BLD: 3.11 M/UL (ref 4–5.2)
RBC # BLD: 3.28 M/UL (ref 4–5.2)
SODIUM BLD-SCNC: 142 MMOL/L (ref 136–145)
SODIUM BLD-SCNC: 142 MMOL/L (ref 136–145)
WBC # BLD: 5.1 K/UL (ref 4–11)
WBC # BLD: 8.8 K/UL (ref 4–11)

## 2023-02-13 PROCEDURE — 7100000001 HC PACU RECOVERY - ADDTL 15 MIN: Performed by: OBSTETRICS & GYNECOLOGY

## 2023-02-13 PROCEDURE — 80048 BASIC METABOLIC PNL TOTAL CA: CPT

## 2023-02-13 PROCEDURE — 3700000000 HC ANESTHESIA ATTENDED CARE: Performed by: OBSTETRICS & GYNECOLOGY

## 2023-02-13 PROCEDURE — 85027 COMPLETE CBC AUTOMATED: CPT

## 2023-02-13 PROCEDURE — 94680 O2 UPTK RST&XERS DIR SIMPLE: CPT

## 2023-02-13 PROCEDURE — 2580000003 HC RX 258: Performed by: OBSTETRICS & GYNECOLOGY

## 2023-02-13 PROCEDURE — 86850 RBC ANTIBODY SCREEN: CPT

## 2023-02-13 PROCEDURE — 6370000000 HC RX 637 (ALT 250 FOR IP)

## 2023-02-13 PROCEDURE — 6370000000 HC RX 637 (ALT 250 FOR IP): Performed by: OBSTETRICS & GYNECOLOGY

## 2023-02-13 PROCEDURE — 36415 COLL VENOUS BLD VENIPUNCTURE: CPT

## 2023-02-13 PROCEDURE — 2500000003 HC RX 250 WO HCPCS: Performed by: ANESTHESIOLOGY

## 2023-02-13 PROCEDURE — 7100000000 HC PACU RECOVERY - FIRST 15 MIN: Performed by: OBSTETRICS & GYNECOLOGY

## 2023-02-13 PROCEDURE — 86901 BLOOD TYPING SEROLOGIC RH(D): CPT

## 2023-02-13 PROCEDURE — 3700000001 HC ADD 15 MINUTES (ANESTHESIA): Performed by: OBSTETRICS & GYNECOLOGY

## 2023-02-13 PROCEDURE — 2709999900 HC NON-CHARGEABLE SUPPLY: Performed by: OBSTETRICS & GYNECOLOGY

## 2023-02-13 PROCEDURE — 88305 TISSUE EXAM BY PATHOLOGIST: CPT

## 2023-02-13 PROCEDURE — 2580000003 HC RX 258: Performed by: ANESTHESIOLOGY

## 2023-02-13 PROCEDURE — 88342 IMHCHEM/IMCYTCHM 1ST ANTB: CPT

## 2023-02-13 PROCEDURE — 3600000014 HC SURGERY LEVEL 4 ADDTL 15MIN: Performed by: OBSTETRICS & GYNECOLOGY

## 2023-02-13 PROCEDURE — G0378 HOSPITAL OBSERVATION PER HR: HCPCS

## 2023-02-13 PROCEDURE — 6360000002 HC RX W HCPCS: Performed by: ANESTHESIOLOGY

## 2023-02-13 PROCEDURE — 2720000010 HC SURG SUPPLY STERILE: Performed by: OBSTETRICS & GYNECOLOGY

## 2023-02-13 PROCEDURE — 88307 TISSUE EXAM BY PATHOLOGIST: CPT

## 2023-02-13 PROCEDURE — 85025 COMPLETE CBC W/AUTO DIFF WBC: CPT

## 2023-02-13 PROCEDURE — 6360000002 HC RX W HCPCS

## 2023-02-13 PROCEDURE — 86900 BLOOD TYPING SEROLOGIC ABO: CPT

## 2023-02-13 PROCEDURE — 3600000004 HC SURGERY LEVEL 4 BASE: Performed by: OBSTETRICS & GYNECOLOGY

## 2023-02-13 PROCEDURE — 6360000002 HC RX W HCPCS: Performed by: OBSTETRICS & GYNECOLOGY

## 2023-02-13 RX ORDER — MAGNESIUM HYDROXIDE 1200 MG/15ML
LIQUID ORAL CONTINUOUS PRN
Status: COMPLETED | OUTPATIENT
Start: 2023-02-13 | End: 2023-02-13

## 2023-02-13 RX ORDER — HYDROMORPHONE HCL 110MG/55ML
0.5 PATIENT CONTROLLED ANALGESIA SYRINGE INTRAVENOUS EVERY 5 MIN PRN
Status: DISCONTINUED | OUTPATIENT
Start: 2023-02-13 | End: 2023-02-13 | Stop reason: HOSPADM

## 2023-02-13 RX ORDER — HYDROMORPHONE HCL 110MG/55ML
PATIENT CONTROLLED ANALGESIA SYRINGE INTRAVENOUS
Status: COMPLETED
Start: 2023-02-13 | End: 2023-02-13

## 2023-02-13 RX ORDER — ONDANSETRON 4 MG/1
4 TABLET, ORALLY DISINTEGRATING ORAL EVERY 8 HOURS PRN
Status: DISCONTINUED | OUTPATIENT
Start: 2023-02-13 | End: 2023-02-13 | Stop reason: HOSPADM

## 2023-02-13 RX ORDER — MIDAZOLAM HYDROCHLORIDE 1 MG/ML
INJECTION INTRAMUSCULAR; INTRAVENOUS PRN
Status: DISCONTINUED | OUTPATIENT
Start: 2023-02-13 | End: 2023-02-13 | Stop reason: SDUPTHER

## 2023-02-13 RX ORDER — ACETAMINOPHEN 325 MG/1
TABLET ORAL
Status: COMPLETED
Start: 2023-02-13 | End: 2023-02-13

## 2023-02-13 RX ORDER — SODIUM CHLORIDE, SODIUM LACTATE, POTASSIUM CHLORIDE, CALCIUM CHLORIDE 600; 310; 30; 20 MG/100ML; MG/100ML; MG/100ML; MG/100ML
INJECTION, SOLUTION INTRAVENOUS CONTINUOUS
Status: DISCONTINUED | OUTPATIENT
Start: 2023-02-13 | End: 2023-02-13 | Stop reason: HOSPADM

## 2023-02-13 RX ORDER — ONDANSETRON 2 MG/ML
4 INJECTION INTRAMUSCULAR; INTRAVENOUS
Status: DISCONTINUED | OUTPATIENT
Start: 2023-02-13 | End: 2023-02-13 | Stop reason: HOSPADM

## 2023-02-13 RX ORDER — SUCCINYLCHOLINE/SOD CL,ISO/PF 200MG/10ML
SYRINGE (ML) INTRAVENOUS PRN
Status: DISCONTINUED | OUTPATIENT
Start: 2023-02-13 | End: 2023-02-13 | Stop reason: SDUPTHER

## 2023-02-13 RX ORDER — SODIUM CHLORIDE 9 MG/ML
INJECTION, SOLUTION INTRAVENOUS CONTINUOUS
Status: DISCONTINUED | OUTPATIENT
Start: 2023-02-13 | End: 2023-02-13

## 2023-02-13 RX ORDER — ONDANSETRON 2 MG/ML
4 INJECTION INTRAMUSCULAR; INTRAVENOUS EVERY 6 HOURS PRN
Status: DISCONTINUED | OUTPATIENT
Start: 2023-02-13 | End: 2023-02-13 | Stop reason: HOSPADM

## 2023-02-13 RX ORDER — SODIUM CHLORIDE 9 MG/ML
INJECTION, SOLUTION INTRAVENOUS PRN
Status: DISCONTINUED | OUTPATIENT
Start: 2023-02-13 | End: 2023-02-13 | Stop reason: HOSPADM

## 2023-02-13 RX ORDER — FERRIC SUBSULFATE 20-22G/100
SOLUTION, NON-ORAL MISCELLANEOUS
Status: COMPLETED | OUTPATIENT
Start: 2023-02-13 | End: 2023-02-13

## 2023-02-13 RX ORDER — SODIUM CHLORIDE 0.9 % (FLUSH) 0.9 %
5-40 SYRINGE (ML) INJECTION PRN
Status: DISCONTINUED | OUTPATIENT
Start: 2023-02-13 | End: 2023-02-13 | Stop reason: HOSPADM

## 2023-02-13 RX ORDER — ATORVASTATIN CALCIUM 20 MG/1
20 TABLET, FILM COATED ORAL NIGHTLY
Status: DISCONTINUED | OUTPATIENT
Start: 2023-02-13 | End: 2023-02-13 | Stop reason: HOSPADM

## 2023-02-13 RX ORDER — ONDANSETRON 2 MG/ML
INJECTION INTRAMUSCULAR; INTRAVENOUS PRN
Status: DISCONTINUED | OUTPATIENT
Start: 2023-02-13 | End: 2023-02-13 | Stop reason: SDUPTHER

## 2023-02-13 RX ORDER — OXYCODONE HYDROCHLORIDE 5 MG/1
10 TABLET ORAL EVERY 4 HOURS PRN
Status: DISCONTINUED | OUTPATIENT
Start: 2023-02-13 | End: 2023-02-13 | Stop reason: HOSPADM

## 2023-02-13 RX ORDER — FAMOTIDINE 20 MG/1
20 TABLET, FILM COATED ORAL 2 TIMES DAILY
Status: DISCONTINUED | OUTPATIENT
Start: 2023-02-13 | End: 2023-02-13 | Stop reason: HOSPADM

## 2023-02-13 RX ORDER — HYDROMORPHONE HCL 110MG/55ML
0.25 PATIENT CONTROLLED ANALGESIA SYRINGE INTRAVENOUS EVERY 5 MIN PRN
Status: DISCONTINUED | OUTPATIENT
Start: 2023-02-13 | End: 2023-02-13 | Stop reason: HOSPADM

## 2023-02-13 RX ORDER — ACETAMINOPHEN 500 MG
500 TABLET ORAL EVERY 6 HOURS PRN
Status: DISCONTINUED | OUTPATIENT
Start: 2023-02-13 | End: 2023-02-13 | Stop reason: HOSPADM

## 2023-02-13 RX ORDER — DEXAMETHASONE SODIUM PHOSPHATE 4 MG/ML
INJECTION, SOLUTION INTRA-ARTICULAR; INTRALESIONAL; INTRAMUSCULAR; INTRAVENOUS; SOFT TISSUE PRN
Status: DISCONTINUED | OUTPATIENT
Start: 2023-02-13 | End: 2023-02-13 | Stop reason: SDUPTHER

## 2023-02-13 RX ORDER — SODIUM CHLORIDE 0.9 % (FLUSH) 0.9 %
5-40 SYRINGE (ML) INJECTION EVERY 12 HOURS SCHEDULED
Status: DISCONTINUED | OUTPATIENT
Start: 2023-02-13 | End: 2023-02-13 | Stop reason: HOSPADM

## 2023-02-13 RX ORDER — ACETAMINOPHEN 500 MG
500 TABLET ORAL EVERY 6 HOURS
Status: DISCONTINUED | OUTPATIENT
Start: 2023-02-13 | End: 2023-02-13 | Stop reason: HOSPADM

## 2023-02-13 RX ORDER — LIDOCAINE HYDROCHLORIDE 20 MG/ML
INJECTION, SOLUTION EPIDURAL; INFILTRATION; INTRACAUDAL; PERINEURAL PRN
Status: DISCONTINUED | OUTPATIENT
Start: 2023-02-13 | End: 2023-02-13 | Stop reason: SDUPTHER

## 2023-02-13 RX ORDER — PROPOFOL 10 MG/ML
INJECTION, EMULSION INTRAVENOUS PRN
Status: DISCONTINUED | OUTPATIENT
Start: 2023-02-13 | End: 2023-02-13 | Stop reason: SDUPTHER

## 2023-02-13 RX ORDER — ROCURONIUM BROMIDE 10 MG/ML
INJECTION, SOLUTION INTRAVENOUS PRN
Status: DISCONTINUED | OUTPATIENT
Start: 2023-02-13 | End: 2023-02-13 | Stop reason: SDUPTHER

## 2023-02-13 RX ORDER — HYDROMORPHONE HYDROCHLORIDE 1 MG/ML
0.5 INJECTION, SOLUTION INTRAMUSCULAR; INTRAVENOUS; SUBCUTANEOUS
Status: DISCONTINUED | OUTPATIENT
Start: 2023-02-13 | End: 2023-02-13 | Stop reason: HOSPADM

## 2023-02-13 RX ORDER — LIDOCAINE HYDROCHLORIDE 10 MG/ML
0.5 INJECTION, SOLUTION EPIDURAL; INFILTRATION; INTRACAUDAL; PERINEURAL ONCE
Status: DISCONTINUED | OUTPATIENT
Start: 2023-02-13 | End: 2023-02-13 | Stop reason: HOSPADM

## 2023-02-13 RX ORDER — FENTANYL CITRATE 50 UG/ML
INJECTION, SOLUTION INTRAMUSCULAR; INTRAVENOUS PRN
Status: DISCONTINUED | OUTPATIENT
Start: 2023-02-13 | End: 2023-02-13 | Stop reason: SDUPTHER

## 2023-02-13 RX ORDER — HYDROMORPHONE HYDROCHLORIDE 1 MG/ML
0.25 INJECTION, SOLUTION INTRAMUSCULAR; INTRAVENOUS; SUBCUTANEOUS
Status: DISCONTINUED | OUTPATIENT
Start: 2023-02-13 | End: 2023-02-13 | Stop reason: HOSPADM

## 2023-02-13 RX ORDER — ACETAMINOPHEN 325 MG/1
650 TABLET ORAL ONCE
Status: COMPLETED | OUTPATIENT
Start: 2023-02-13 | End: 2023-02-13

## 2023-02-13 RX ORDER — LOSARTAN POTASSIUM 25 MG/1
50 TABLET ORAL DAILY
Status: DISCONTINUED | OUTPATIENT
Start: 2023-02-13 | End: 2023-02-13 | Stop reason: HOSPADM

## 2023-02-13 RX ORDER — OXYCODONE HYDROCHLORIDE 5 MG/1
5 TABLET ORAL EVERY 4 HOURS PRN
Status: DISCONTINUED | OUTPATIENT
Start: 2023-02-13 | End: 2023-02-13 | Stop reason: HOSPADM

## 2023-02-13 RX ADMIN — FENTANYL CITRATE 50 MCG: 50 INJECTION, SOLUTION INTRAMUSCULAR; INTRAVENOUS at 08:36

## 2023-02-13 RX ADMIN — MIDAZOLAM 2 MG: 1 INJECTION INTRAMUSCULAR; INTRAVENOUS at 08:26

## 2023-02-13 RX ADMIN — SODIUM CHLORIDE: 9 INJECTION, SOLUTION INTRAVENOUS at 13:35

## 2023-02-13 RX ADMIN — HYDROMORPHONE HYDROCHLORIDE 0.5 MG: 2 INJECTION, SOLUTION INTRAMUSCULAR; INTRAVENOUS; SUBCUTANEOUS at 12:17

## 2023-02-13 RX ADMIN — ACETAMINOPHEN 650 MG: 325 TABLET ORAL at 07:45

## 2023-02-13 RX ADMIN — ROCURONIUM BROMIDE 30 MG: 10 INJECTION, SOLUTION INTRAVENOUS at 08:47

## 2023-02-13 RX ADMIN — Medication 120 MG: at 08:40

## 2023-02-13 RX ADMIN — DEXAMETHASONE SODIUM PHOSPHATE 6 MG: 4 INJECTION, SOLUTION INTRAMUSCULAR; INTRAVENOUS at 08:46

## 2023-02-13 RX ADMIN — FENTANYL CITRATE 25 MCG: 50 INJECTION, SOLUTION INTRAMUSCULAR; INTRAVENOUS at 10:28

## 2023-02-13 RX ADMIN — PHENYLEPHRINE HYDROCHLORIDE 100 MCG: 10 INJECTION INTRAVENOUS at 11:18

## 2023-02-13 RX ADMIN — PROPOFOL 150 MG: 10 INJECTION, EMULSION INTRAVENOUS at 08:39

## 2023-02-13 RX ADMIN — HYDROMORPHONE HYDROCHLORIDE 0.5 MG: 2 INJECTION, SOLUTION INTRAMUSCULAR; INTRAVENOUS; SUBCUTANEOUS at 11:53

## 2023-02-13 RX ADMIN — SODIUM CHLORIDE: 9 INJECTION, SOLUTION INTRAVENOUS at 12:22

## 2023-02-13 RX ADMIN — ACETAMINOPHEN 500 MG: 500 TABLET ORAL at 17:07

## 2023-02-13 RX ADMIN — ONDANSETRON 4 MG: 2 INJECTION INTRAMUSCULAR; INTRAVENOUS at 08:46

## 2023-02-13 RX ADMIN — PHENYLEPHRINE HYDROCHLORIDE 200 MCG: 10 INJECTION INTRAVENOUS at 11:30

## 2023-02-13 RX ADMIN — FENTANYL CITRATE 25 MCG: 50 INJECTION, SOLUTION INTRAMUSCULAR; INTRAVENOUS at 09:29

## 2023-02-13 RX ADMIN — PHENYLEPHRINE HYDROCHLORIDE 100 MCG: 10 INJECTION INTRAVENOUS at 10:55

## 2023-02-13 RX ADMIN — PHENYLEPHRINE HYDROCHLORIDE 100 MCG: 10 INJECTION INTRAVENOUS at 11:11

## 2023-02-13 RX ADMIN — LIDOCAINE HYDROCHLORIDE 80 MG: 20 INJECTION, SOLUTION EPIDURAL; INFILTRATION; INTRACAUDAL; PERINEURAL at 08:36

## 2023-02-13 RX ADMIN — HYDROMORPHONE HYDROCHLORIDE 0.5 MG: 1 INJECTION, SOLUTION INTRAMUSCULAR; INTRAVENOUS; SUBCUTANEOUS at 13:39

## 2023-02-13 RX ADMIN — SODIUM CHLORIDE: 9 INJECTION, SOLUTION INTRAVENOUS at 13:34

## 2023-02-13 RX ADMIN — SUGAMMADEX 200 MG: 100 INJECTION, SOLUTION INTRAVENOUS at 11:20

## 2023-02-13 RX ADMIN — PHENYLEPHRINE HYDROCHLORIDE 100 MCG: 10 INJECTION INTRAVENOUS at 11:03

## 2023-02-13 RX ADMIN — SODIUM CHLORIDE, POTASSIUM CHLORIDE, SODIUM LACTATE AND CALCIUM CHLORIDE: 600; 310; 30; 20 INJECTION, SOLUTION INTRAVENOUS at 07:59

## 2023-02-13 ASSESSMENT — PAIN DESCRIPTION - PAIN TYPE
TYPE: SURGICAL PAIN
TYPE: SURGICAL PAIN

## 2023-02-13 ASSESSMENT — PAIN DESCRIPTION - LOCATION
LOCATION: HIP
LOCATION: HIP

## 2023-02-13 ASSESSMENT — PAIN SCALES - GENERAL
PAINLEVEL_OUTOF10: 8
PAINLEVEL_OUTOF10: 8

## 2023-02-13 ASSESSMENT — PAIN DESCRIPTION - DESCRIPTORS: DESCRIPTORS: ACHING

## 2023-02-13 ASSESSMENT — PAIN DESCRIPTION - ORIENTATION
ORIENTATION: LEFT;RIGHT
ORIENTATION: LEFT;RIGHT

## 2023-02-13 NOTE — PROGRESS NOTES
Shift assessment completed and charted. VSS. A/o x4. Standard safety measures in place. Bed locked and in lowest position, call light within reach. SpO2 > 90% on RA. PRN given for pain, patient encouraged to manage pain with PO prns as she will not have access to dilaudid when she is at home. Patient verbalized her understanding that Dr. Noe Hunter expectation post op is to get up to chair within 6 hours of surgery. Pt ambulated 25 ft in room - tolerated it fairly well. Pt tolerating clears. Pt stable and denied needs when writer left room.

## 2023-02-13 NOTE — DISCHARGE INSTRUCTIONS
Hysteroscopy With Dilation and Curettage: What to Expect at [address] [facility]     For a hysteroscopy, your doctor guides a lighted tube through your cervix and into your uterus. This helps the doctor see inside your uterus. For a dilation and curettage (D&C), your doctor uses a curved tool, called a curette, to gently scrape tissue from your uterus. After these procedures, you are likely to have a backache or cramps similar to menstrual cramps. Expect to pass small clots of blood from your vagina for the first few days. You may have light vaginal bleeding for several weeks after the D&C. If the doctor filled your uterus with air, your belly may feel full. You may also have shoulder pain right after the procedure. You will probably be able to go back to most of your normal activities in 1 or 2 days. This care sheet gives you a general idea about how long it will take for you to recover. But each person recovers at a different pace. Follow the steps below to get better as quickly as possible. How can you care for yourself at home? Activity    Rest when you feel tired. You will probably be able to return to work the day after the procedure. But it depends on what was done and the type of work you do. You may have some light vaginal bleeding. Use sanitary pads until you stop bleeding. Using pads makes it easier to monitor your bleeding. Do not rinse your vagina with fluid (douche). Ask your doctor when it is okay for you to have sex. Diet    You can eat your normal diet. If your stomach is upset, try bland, low-fat foods like plain rice, broiled chicken, toast, and yogurt. Drink plenty of fluids (unless your doctor tells you not to). Medicines    Your doctor will tell you if and when you can restart your medicines. You will also get instructions about taking any new medicines.      If you stopped taking aspirin or some other blood thinner, your doctor will tell you when to start taking it again. Be safe with medicines. Read and follow all instructions on the label. If the doctor gave you a prescription medicine for pain, take it as prescribed. If you are not taking a prescription pain medicine, ask your doctor if you can take an over-the-counter medicine. If your doctor prescribed antibiotics, take them as directed. Do not stop taking them just because you feel better. You need to take the full course of antibiotics. Follow-up care is a key part of your treatment and safety. Be sure to make and go to all appointments, and call your doctor if you are having problems. It's also a good idea to know your test results and keep a list of the medicines you take. When should you call for help? Call 911 anytime you think you may need emergency care. For example, call if:    You passed out (lost consciousness). You have chest pain, are short of breath, or cough up blood. Call your doctor now or seek immediate medical care if:    You have pain that does not get better after you take pain medicine. You cannot pass stools or gas. You have bright red vaginal bleeding that soaks one or more pads in an hour, or you have large clots. You have a vaginal discharge that has increased or that smells bad. You are sick to your stomach or cannot drink fluids. You have symptoms of a blood clot in your leg (called a deep vein thrombosis), such as:  Pain in your calf, back of the knee, thigh, or groin. Redness and swelling in your leg. You have signs of infection, such as: Increased pain, swelling, warmth, or redness. A fever. Watch closely for changes in your health, and be sure to contact your doctor if you have any problems. Current as of: February 23, 2022               Content Version: 13.5  © 2006-2022 [employer]. Care instructions adapted under license by [facility] ([facility]).  If you have questions about a medical condition or this instruction, always ask your healthcare professional. [place] [address] any warranty or liability for your use of this information.

## 2023-02-13 NOTE — ANESTHESIA POSTPROCEDURE EVALUATION
Department of Anesthesiology  Postprocedure Note    Patient: Monisha Jimenes  MRN: [id number]  YOB: 1955  Date of evaluation: 2/13/2023      Procedure Summary     Date: 02/13/23 Room / Location: [address]    Anesthesia Start: 0831 Anesthesia Stop: 1147    Procedures:       HYSTEROSCOPY DILATATION AND CURETTAGE (Vagina )      LOOP ELECTROSURGICAL EXCISION PROCEDURE (Vagina ) Diagnosis:       Postmenopausal bleeding      (Postmenopausal bleeding [N95.0])    Surgeons: Garry Archer MD Responsible Provider: Jessica Serrano MD    Anesthesia Type: general ASA Status: 3          Anesthesia Type: No value filed.     Ender Phase I: Ender Score: 10    Ender Phase II:        Anesthesia Post Evaluation    Patient location during evaluation: PACU  Patient participation: complete - patient participated  Level of consciousness: awake  Airway patency: patent  Nausea & Vomiting: no vomiting  Complications: no  Cardiovascular status: hemodynamically stable  Respiratory status: acceptable  Hydration status: euvolemic  Multimodal analgesia pain management approach

## 2023-02-13 NOTE — PROGRESS NOTES
Pt resting quietly in bed with eyes closed, awakens to voice. VSS, O2 sats 100% on 3 L NC. Corie-pad remains in place with scant drainage. H/H resulted, Dr. Catherine Sahni notified. Pt seen by anesthesia, phase 1 criteria met. Will transfer pt to 5T.

## 2023-02-13 NOTE — PROGRESS NOTES
4 Eyes Skin Assessment     The patient is being assess for   Admission    I agree that 2 RN's have performed a thorough Head to Toe Skin Assessment on the patient. ALL assessment sites listed below have been assessed. Areas assessed for pressure by both nurses:   [x]   Head, Face, and Ears   [x]   Shoulders, Back, and Chest, Abdomen  [x]   Arms, Elbows, and Hands   [x]   Coccyx, Sacrum, and Ischium  [x]   Legs, Feet, and Heels        Skin Assessed Under all Medical Devices by both nurses:  SCD's              All Mepilex Borders were peeled back and area peeked at by both nurses:  No: Skin assessed, mepilex not used  Please list where Mepilex Borders are located:  N/A             **SHARE this note so that the co-signing nurse is able to place an eSignature**    Co-signer eSignature: {Esignature:[id number]}    Does the Patient have Skin Breakdown related to pressure?   No            Faustino Prevention initiated:  No   Wound Care Orders initiated:  No      WOC nurse consulted for Pressure Injury (Stage 3,4, Unstageable, DTI, NWPT, Complex wounds)and New or Established Ostomies:  No      Primary Nurse eSignature: Electronically signed by Saulo Schmitt RN on 2/13/23 at 2:38 PM EST

## 2023-02-13 NOTE — PROGRESS NOTES
Incentive Spirometry education and demonstration completed by Respiratory Therapy Yes      Response to education: Excellent     Teaching Time: 15 minutes    Minimum Predicted Vital Capacity - 616 mL. Patient's Actual Vital Capacity - 2000 mL. Turning over to Nursing for routine follow-up Yes.     Comments: Is goal met    Electronically signed by Deandre Gunderson RCP on 2/13/2023 at 3:50 PM

## 2023-02-13 NOTE — H&P
Contacted Elvin to schedule a hearing aid fitting but her voicemail was full.  Left a voicemail for patient's daughter Joelle to return call.      Please schedule a Hearing aid fitting with Dr. Lissa Ramos.  Dr. Ramos is available on Monday, November 7 at 10:30 am during her CI time.      Hearing aids are in clinic.     Patient Name: Billie Cabello  Patient : 1955  Patient MRN: [id number]    Primary Oncologist: Jordyn Scott (Medical Oncology)  Referring Physician: Flako Mccabe MD      Originating [facility] [id number]  Distant Site: [facility], [facility]    MA/PCC involved in today's visit:   MD/JOSUE involved in today's visit: Carlos Alberto Posada MD    Chief Complaint  Post menopausal bleeding    Problem List  Post menopausal bleeding  Cancer cervix screening status (finding)  DVT - Deep vein thrombosis  Essential HTN  Former smoker  History of pulmonary embolus (situation)  Limited mobility  Long term current use of anticoagulants  Obesity - Body mass index 40+  Pedal edema  Viral screening status (finding)       HPI  She was [age] years old when she started her menstrual cycle. She can not recall her last menstrual cycle. Patient has been referred by Dr. Reid Ramos for evaluation of postmenopausal bleeding. Patient initiated care with him May 19, 2022 for consideration of long-term use of anticoagulants. In 2018 she had an unprovoked pulmonary embolus with no DVTs. This was felt to be related to her Immobility, chronic knee pain, obesity, hypertension and persistent pedal edema. She did report to him that she had had vaginal bleeding for approximately 1 month, so has been asked to see me for further evaluation. Previous Therapies    Current Therapy         Interval History  USHA Harding is an established patient of the practice, calling today to discuss results of hysteroscopy D&C and plan going forward.      Review of Systems    CONSTITUTIONAL:  Negative  ENT / MOUTH:  Negative  CARDIOVASCULAR:  Negative  RESPIRATORY:  Negative  BREAST:  Negative  GASTROINTESTINAL:  Negative  URINARY:  Negative  GYNELOGICAL:  Negative  MUSCULOSKELETAL:  Negative  SKIN:  Negative  NEUROLOGIC: Negative  PSYCHIATRIC: Negative  LYMPHATIC:  Negative    CONSTITUTIONAL: Well-appearing, no acute distress. Comfortable, conversant  EYES: Sclera clear. On visual examination there appears to be no functional abnormalities  ENT: Visual examination of external facial features appear normal.  LUNGS: CTAB  CARDIOVASCULAR: RRR  ABDOMEN: Unable to do full abdominal exam  MUSCULOSKELETAL: There is no apparent redness, or swelling of the joints. NEUROLOGIC: Awake, alert. Speech is fluent and cogent. Thought processes are clear. The patient is moving all extremities without difficulty. SKIN: No bruising or bleeding. No obvious skin bruises. Exam was limited to head and neck  PSYCH: Normal affect.     Labs  CBC  Lab Results 09/12/2022 06/10/2022 01/09/2020    CBC           WBC x 10^3/uL   4.6       RBC x 10^6/uL   4.24       HGB g/dL   13.0       HCT %   38.8       MCV fL   91.4       MCH pg   30.7       MCHC g/dL   33.6       RDW %   14.6       PLT x 10^3/uL   201       MPV fL   10.7 (H)       Damion %   39.1       LY %   46.9       MO %   5.6       EO %   7.2       DAMION #, cells/uL   1.8       LY #, cells/uL   2.1       BA %   1.2       MO #, cells/uL   0.3       EO #, cells/uL   0.3       BA #, cells/uL   0.1   CMP  Lab Results 09/12/2022 06/10/2022 01/09/2020    Chemistries           Glucose, serum mg/dL   96       BUN mg/dL   19       Creatinine mg/dL   0.9       Sodium mmol/L   144       Potassium mmol/L   4.1       Chloride mmol/L   110       CO2 mmol/L   20 (L)       Anion gap   14       Calcium mg/dL   9.8       GFR non-African American, estimated mL/min/1.73m2   >60       GFR African American, estimated mL/min/1.73m2   >60             Lab Results 09/12/2022 06/10/2022 01/09/2020    Immunohematology           ABO/Rh   A POS       Antibody screen   NEG          Imaging    Pathology ENDOCERVIAL UTERINE MASS 09/12/2022  Comment:  PART A: Scattered detached squamous epithelium present and shows strong and diffuse p16 staining.    _______________    Rosalene Rebecca 06/10/2022  Impression:  No acute cardiopulmonary disease.  ________________    Bilateral Dopplers May 26, 2022  No DVTs    Bilateral Dopplers 12/26/2018 no DVTs        Assessment & Plan    1. Postmenopausal bleeding  hysteroscopy D&C completed 9/2022  submucosal fibroid, incompletely resected. ECC with HSIL  Given the finding of HSIL and incompletely resected fibroid. I am recommending hysteroscopy D&C w/ aveta. Also pt needs a LEEP. 2. History of unprovoked PE December 2018  Lifetime anticoagulation has been advised  I will ask Dr. Eugene Smith to manage patient's anticoagulation prior to her surgery. 3. Mobility limited due to knee pain, pedal edema, hypertension and BMI of 47    4. Not up-to-date on health maintenance  Pap with HPV 9/2022  Mammogram advised  Colonoscopy advised  DEXA scan encouraged    5. pap with LSIL HPV OH positive-  HSIL on ECC intraop. Will perform excisional procedure. Patient needs medical clearance for surgery: yes (recieved previously in September). Needs to see PCP again. .  Carlos Alberto Polo MD  [facility], Gynecologic Oncology  Phone: [phone]  Fax: [phone]  Online: [email or web address] [place]     Note Recipients:  Sandoval Hilliard MD (Referring)        Electronically signed by Arturo Persaud.  Ish Polo MD 01/02/2023 17:05 EST

## 2023-02-13 NOTE — ADDENDUM NOTE
Addendum  created 02/13/23 1154 by Carolynne Dubin, MD    Order list changed, Order sets accessed, Pharmacy for encounter modified

## 2023-02-13 NOTE — ANESTHESIA PRE PROCEDURE
Department of Anesthesiology  Preprocedure Note       Name:  Emmanuel Saltre   Age:  74 y.o.  :  1955                                          MRN:  [id number]         Date:  2023      Surgeon: Ashley Coburn):  Jasiel Villa MD    Procedure: Procedure(s): HYSTEROSCOPY DILATATION AND CURETTAGE  LOOP ELECTROSURGICAL EXCISION PROCEDURE    Medications prior to admission:   Prior to Admission medications    Medication Sig Start Date End Date Taking? Authorizing Provider   ELIQUIS 2.5 MG TABS tablet Take 1 tablet by mouth 2 times daily 22   Jasiel Villa MD   acetaminophen (TYLENOL) 500 MG tablet Take 1 tablet by mouth every 6 hours as needed for Pain 22   Jasiel Villa MD   atorvastatin (LIPITOR) 20 MG tablet  22   Historical Provider, MD   furosemide (LASIX) 20 MG tablet  22   Historical Provider, MD   losartan (COZAAR) 50 mg tablet Losartan Oral  oral   daily     active    Historical Provider, MD       Current medications:    Current Facility-Administered Medications   Medication Dose Route Frequency Provider Last Rate Last Admin    lactated ringers IV soln infusion   IntraVENous Continuous Jasiel Villa MD        lidocaine PF 1 % injection 0.5 mL  0.5 mL IntraDERmal Once Jasiel Villa MD           Allergies:     Allergies   Allergen Reactions    Lisinopril      cough       Problem List:    Patient Active Problem List   Diagnosis Code    [facility] I26.99    Pneumonia due to organism J18.9    Acute respiratory failure with hypoxia (HCC) J96.01    Bilateral pulmonary embolism (HCC) I26.99    Postmenopausal bleeding N95.0       Past Medical History:        Diagnosis Date    Hx of blood clots     PE    Hyperlipidemia     Hypertension        Past Surgical History:        Procedure Laterality Date    DILATION AND CURETTAGE OF UTERUS N/A 2022    HYSTEROSCOPY DILATATION AND CURETTAGE -Bernie Mccormack; PAP SMEAR performed by Jasiel Villa MD at [address] [id number] [facility]     TUBAL LIGATION         Social History:    Social History     Tobacco Use    Smoking status: Former     Packs/day: 2.00     Years: 30.00     Pack years: 60.00     Types: Cigarettes     Quit date: 2013     Years since quittin.1    Smokeless tobacco: Never   Substance Use Topics    Alcohol use: No                                Counseling given: Not Answered      Vital Signs (Current):   Vitals:    23 1348   Weight: 280 lb (127 kg)   Height: 5' 7\" (1.702 m)                                              BP Readings from Last 3 Encounters:   22 (!) 144/72   20 122/80   19 123/71       NPO Status:                                                                                 BMI:   Wt Readings from Last 3 Encounters:   23 280 lb (127 kg)   22 284 lb 14.4 oz (129.2 kg)   22 291 lb (132 kg)     Body mass index is 43.85 kg/m². CBC:   Lab Results   Component Value Date/Time    WBC 6.0 2023 02:53 PM    RBC 3.67 2023 02:53 PM    HGB 11.0 2023 02:53 PM    HCT 33.9 2023 02:53 PM    MCV 92.4 2023 02:53 PM    RDW 15.3 2023 02:53 PM     2023 02:53 PM       CMP:   Lab Results   Component Value Date/Time     2023 02:53 PM    K 4.3 2023 02:53 PM    K 4.3 2018 07:08 PM     2023 02:53 PM    CO2 25 2023 02:53 PM    BUN 21 2023 02:53 PM    CREATININE 1.1 2023 02:53 PM    GFRAA >60 2022 07:00 AM    AGRATIO 1.0 2018 07:08 PM    LABGLOM 56 2023 02:53 PM    GLUCOSE 90 2023 02:53 PM    PROT 8.5 2018 07:08 PM    CALCIUM 9.7 2023 02:53 PM    BILITOT 0.3 2018 07:08 PM    ALKPHOS 75 2018 07:08 PM    AST 21 2018 07:08 PM    ALT 15 2018 07:08 PM       POC Tests: No results for input(s): POCGLU, POCNA, POCK, POCCL, POCBUN, POCHEMO, POCHCT in the last 72 hours.     Coags:   Lab Results   Component Value Date/Time    PROTIME 13.9 12/26/2018 04:14 AM    INR 1.22 12/26/2018 04:14 AM    APTT 43.3 12/27/2018 02:05 AM       HCG (If Applicable):   Lab Results   Component Value Date    PREGTESTUR Negative 09/12/2022        ABGs:   Lab Results   Component Value Date/Time    PHART 7.371 12/24/2018 08:05 PM    PO2ART 114.0 12/24/2018 08:05 PM    DRK6WFT 44.7 12/24/2018 08:05 PM    UNG8ZMY 25.9 12/24/2018 08:05 PM    BEART 0.2 12/24/2018 08:05 PM    W7ZQQVWZ 98.5 12/24/2018 08:05 PM        Type & Screen (If Applicable):  No results found for: LABABO, LABRH    Drug/Infectious Status (If Applicable):  No results found for: HIV, HEPCAB    COVID-19 Screening (If Applicable): No results found for: COVID19        Anesthesia Evaluation  Patient summary reviewed and Nursing notes reviewed no history of anesthetic complications:   Airway: Mallampati: II  TM distance: >3 FB   Neck ROM: full  Mouth opening: > = 3 FB   Dental:      Comment: Top left broken molar, top small chip front tooth    Pulmonary:       (-) COPD, asthma, rhonchi and wheezes                          ROS comment: Former smoker   Cardiovascular:    (+) hypertension:,     (-) CABG/stent, dysrhythmias and  angina      Rhythm: regular  Rate: normal                 ROS comment: EF - 2D % 62.90   LVOT SV cm3 62.50   IVSd 0.6 - 1.1 cm 1.40   LVPWD cm 1.4   LVIDd 3.5 - 6.0 cm 3.6   LVIDs 2.1 - 4.0 cm 2.42   LV ESV 2D teichholz mL 20.6   LV EDV 2D teichholz mL 55.6   LVOT diameter cm 2.0   LVOT area cm2 3.10   MV pk E emilio cm/s 74.7   MV E/A ratio  0.7   MV pk A emilio cm/sec 106.2   MV e' lateral cm/s 5.5   MV e' septal cm/s 6.2   MV deceleration time sec 0.19   MV E/e' septal  12.0   LA ESV (BP) Index ml/m2 23.5   MV E/e' lateral  13.5   LA ESV (BP) ml 51.8   Lat A' emilio cm/sec 11.7   Med Peak A' Emilio cm/sec 12.1   LVOT pk emilio cm/sec 95   LVOT MG mmHg 2   LV max PG mmHg 4   LA size cm 3.5   LA to aorta ratio  1.07   LA Length (A2C) cm 4.5   LA Length (A4C) cm 4.50   LA Area (A2C) cm2 14.70   LA Area (A4C) cm2 19.80   LA Vol (A2C) ml 38.8   LA Vol (A4C) ml 69.3   RV dimension diastole basal cm 2.7   RV dimension diastole medial cm 2.3   TAPSE cm 2.8   RV S Vel_phl cm/sec 13.6   AV MG mmHg 4.1   AV peak gradient mmHg 5.8   Aortic valve mean velocity cm/sec 99   AV pk mauricio cm/sec 120   AV VTI cm 26.2   LVOT PG cm/sec 69.3   LVOT VTI cm 20.2   AV area cm2 2.38   AV area index  1.1   AV area continuity by pk velocity cm2 2.44   AV VR_phl  0.78   MV mean PG mmHg 2   MV PG mmHg 3.8   MV mean mauricio cm/sec 65.6   MV pk mauricio cm/s 97.7   MV area continuity equation cm2 2.60   MV VTI cm 23.8   MV deceleration slope cm/sec2 398.0   Tricuspid valve peak regurgitation velocity cm/sec 223   TR Max PG mmHg 20   PV MG mmHg 3   PV max mauricio cm/s 103.7   PV PG mmHg 4   PV VTI cm 24.2   PV mean mauricio cm/s 76.5   Aortic root annulus diameter cm 3.3   RA A4Cs_phl cm2 14.90   MV MG cm/sec 65.6   AV area continuity by VTI cm2 2.380   Resulting Agency  [facility]  Narrative  Performed by [facility]  This result has an attachment that is not available.   Left Ventricle: Left ventricle size is normal. Normal wall thickness. Ventricular mass is normal. No wall motion abnormalities noted. Septal   motion is normal. The EF by visual approximation is 50 - 55%. Grade I   diastolic dysfunction.   Aortic Valve: Valve structure and function is normal.     Mitral Valve: Valve structure is normal. No transvalvular   regurgitation.   Tricuspid Valve: Normal tricuspid. Mild transvalvular regurgitation. Neuro/Psych:      (-) TIA and CVA           GI/Hepatic/Renal:            ROS comment: occ acid reflux symptoms. None recently or today  anemia. Endo/Other:    (+) blood dyscrasia: anticoagulation therapy:., .                 Abdominal:   (+) obese,           Vascular:   + PE. Other Findings:           Anesthesia Plan      general     ASA 3       Induction: intravenous.     MIPS: Postoperative opioids intended and Prophylactic antiemetics administered. Anesthetic plan and risks discussed with patient. Plan discussed with CRNA.                     Steve Romero MD   2/13/2023

## 2023-02-13 NOTE — PROGRESS NOTES
Pt arrived from OR to PACU, awakens to voice, c/o pain in bilateral hips on awakening. VSS, O2 sats 98% on 6 L simple mask. Corie-pad in place, dry. Verbal orders from Dr. Hair Ferreira for CBC, BMP and type and screen. Will draw and treat pain per MAR.

## 2023-02-13 NOTE — H&P
Date of Surgery Update:  Sarahy Roca was seen, history and physical examination reviewed, and patient examined by me today. There have been no significant clinical changes since the completion of the previous history and physical.    The risk, benefits, and alternatives of the proposed procedure have been explained to the patient (or appropriate guardian) and understanding verbalized. All questions answered. Patient wishes to proceed.     Electronically signed by: Rodney Pedersen MD,2/13/2023,8:29 AM

## 2023-02-23 NOTE — OP NOTE
Operative Note      Patient: Funmi Yu  YOB: 1955  MRN: [id number]    Date of Procedure: 2/13/2023    Pre-Op Diagnosis: submucosal fibroid, cervical dysplasia    Post-Op Diagnosis: Same       Procedure:  Hysteroscopic Myomectomy-22 modifier  Loop Excisional Electrocautery Procedure    Surgeon(s):  Rupinder Horton MD    Assistant:   * No surgical staff found *    Anesthesia: General    Estimated Blood Loss (mL): less than 50     Complications: None    Specimens:   ID Type Source Tests Collected by Time Destination   A : A) ENDOCERVICAL CURETTINGS #1 Tissue Tissue SURGICAL PATHOLOGY Rupinder Horton MD 2/13/2023 0902    B : B) ENDOCERVICAL CURETTINGS #2 Tissue Tissue SURGICAL PATHOLOGY Rupinder Horton MD 2/13/2023 1021    C : C) UTERINE MASS Tissue Tissue SURGICAL PATHOLOGY Rupinder Horton MD 2/13/2023 1022    D : D) SURGICAL LEEP Tissue Tissue 2701 Shari Neri MD 2/13/2023 1022        Implants:  * No implants in log *      Drains: * No LDAs found *    Findings: large uterine fibroid occupying the endometrial canal. Unable to be completely resected due to fluid deficit. Overall normal appearing cervix without grossly identifiable lesions. Indication for procedure:  74 y.o. with submucosal fibroid with prior attempt for complete resection however incompletely resected with myosure. Fluid deficit limit reached and decision made to schedule another resection in the future using the larger aveta resection apparatus. Patient also has an abnormal pap smear and LEEP is planned. Detailed Description of Procedure: The patient was taken to the operating room. She was placed in a dorsal supine position with sequential compression devices on prior to the administration of anesthesia. A sterile weighted speculum was placed into the patient's vagina. The cervix and vagina were visualized. 0.25% Marcaine was used to infiltrate the tracy of the cervix.   Using a loop electrocautery device the tracy of the cervix was excised with a corresponding top hat. An additional endocervical curettage was performed. A sterile speculum was placed. There cervix was visualized. The anterior lip of the cervix was grasped with a long Allis. The cervical canal was dilated to a 16 Uzbek size. The Aveta device was primed and inserted. The device was used to remove the fibroid. The submucosal fibroid is large and required multiple aveta devices. Attempts were made to remove the entire fibroid this took an excessive amount of time due to the size of the fibroid. It took 40 minutes of extra time and effort. Due the fluid deficit, the procedure needed to be ended. At that time, 85-95% of the fibroid was removed. Specimens were sent to pathology and all instruments were removed. Counts were correct x 2. The patient was taken to the PACU in stable condition.       Electronically signed by Kimberley Willrad MD on 2/23/2023 at 8:48 AM

## 2023-02-23 NOTE — DISCHARGE SUMMARY
Physician Discharge Summary     Patient ID:  Yanna Fernandez  [id number]  74 y.o.  1955    Admit date: 2/13/2023    Discharge date and time: 2/13/2023  7:05 PM     Admitting Physician: Gricel Vallejo MD     Discharge Physician: Gricel Vallejo MD      Admission Diagnoses: Postmenopausal bleeding [N95.0]  Fibroid uterus [D25.9]    Discharge Diagnoses: same    Admission Condition: good    Discharged Condition: good    Indication for Admission: observation to monitor electrolytes following hysteroscopy [facility]      Hospital Course: uneventful    Consults: none    Significant Diagnostic Studies: labs:   Recent Labs     02/13/23  1558 02/13/23  1210 02/09/23  1453   WBC 8.8 5.1 6.0   HGB 9.2* 9.7* 11.0*   HCT 28.8* 30.2* 33.9*   MCV 92.7 91.9 92.4    248 302     Lab Results   Component Value Date/Time     02/13/2023 03:58 PM    K 4.5 02/13/2023 03:58 PM    K 4.4 02/13/2023 12:10 PM     02/13/2023 03:58 PM    CO2 24 02/13/2023 03:58 PM    BUN 16 02/13/2023 03:58 PM    CREATININE 0.9 02/13/2023 03:58 PM    GLUCOSE 157 02/13/2023 03:58 PM    CALCIUM 8.5 02/13/2023 03:58 PM          Treatments: surgery: hysteroscopy D&C, myomectomy    Discharge Exam:  NAD  Incision c/d/i  No c/c/e    Disposition: home    Patient Instructions:   [unfilled]  Activity: per discharge instructions  Diet: regular diet  Wound Care: keep wound clean and dry    Follow-up with Dr. La Nena Napier in 3 weeks.     Signed:  Gricel aVllejo MD  2/23/2023  8:43 AM

## 2023-09-22 NOTE — TELEPHONE ENCOUNTER
Pt called in, informed her of Nena's message and let her know we did hold samples for her at the office. Spoke to patient : confirmed to him instructions for B/P med (as per Pharm D) take one time a day . Patient repeated instructions and voiced understanding.  Reviewed 9/25/23 appointments with patient .

## (undated) DEVICE — ELECTRODE ES L11CM DIA5MM BALL SHFT RED DISP UTAHLOOP

## (undated) DEVICE — SURE SET SINGLE BASIN-LF: Brand: MEDLINE INDUSTRIES, INC.

## (undated) DEVICE — TUBING, SUCTION, 1/4" X 10', STRAIGHT: Brand: MEDLINE

## (undated) DEVICE — BOWL MED L 32OZ PLAS W/ MOLD GRAD EZ OPN PEEL PCH

## (undated) DEVICE — TUBING SET SGL PT TBNG OD1/2IN 3/8INX6FT

## (undated) DEVICE — SET FLD MGMT OUTFLO TB DISP FOR CTRL SYS AQUILEX

## (undated) DEVICE — SET EXTN PRIMING 4.9ML L30IN INCL SLDE CLMP SPIN M LUERLOCK

## (undated) DEVICE — GAUZE,SPONGE,4"X4",8PLY,STRL,LF,10/TRAY: Brand: MEDLINE

## (undated) DEVICE — SUTURE VCRL + SZ 0 L27IN ABSRB WHT CT-2 1/2 CIR TAPERPOINT VCP270H

## (undated) DEVICE — NEEDLE HYPO 22GA L1.5IN BLK POLYPR HUB S STL REG BVL STR

## (undated) DEVICE — SOUND DISP UTER 9 3 4 25 BX

## (undated) DEVICE — MERCY FAIRFIELD TURNOVER KIT: Brand: MEDLINE INDUSTRIES, INC.

## (undated) DEVICE — BLANKET WRM W29.9XL79.1IN UP BODY FORC AIR MISTRAL-AIR

## (undated) DEVICE — Device

## (undated) DEVICE — SET ENDOSCP SEAL HYSTEROSCOPE RIG OUTFLO CHN DISP MYOSURE

## (undated) DEVICE — STERILE POLYISOPRENE POWDER-FREE SURGICAL GLOVES: Brand: PROTEXIS

## (undated) DEVICE — LIGHT HANDLE: Brand: DEVON

## (undated) DEVICE — DRAPE,REIN 53X77,STERILE: Brand: MEDLINE

## (undated) DEVICE — D & C-LF: Brand: MEDLINE INDUSTRIES, INC.

## (undated) DEVICE — YANKAUER,BULB TIP,W/O VENT,RIGID,STERILE: Brand: MEDLINE

## (undated) DEVICE — DEVICE TISS REMOVING L25.25IN OD4MMXL HYSTEROSCOPIC SGL USE

## (undated) DEVICE — SYRINGE MED 30ML STD CLR PLAS LUERLOCK TIP N CTRL DISP

## (undated) DEVICE — SOLUTION IRRIG 1000ML 0.9% SOD CHL USP POUR PLAS BTL

## (undated) DEVICE — DEVICE TISS REM IU CANSTR VAC TB FT PEDAL DISPOSABLE MYOSURE

## (undated) DEVICE — SHEET,DRAPE,53X77,STERILE: Brand: MEDLINE

## (undated) DEVICE — GLOVE SURG SZ 65 THK91MIL LTX FREE SYN POLYISOPRENE

## (undated) DEVICE — PAD,ABDOMINAL,8"X10",ST,LF: Brand: MEDLINE

## (undated) DEVICE — PAD N ADH W3XL4IN POLY COT SFT PERF FLM EASILY CUT ABSRB

## (undated) DEVICE — NEEDLE SPNL L3.5IN PNK HUB S STL REG WALL FIT STYL W/ QNCKE

## (undated) DEVICE — SET FLD MGMT CTRL SYS INFLO TB AQUILEX

## (undated) DEVICE — COAGULATOR SUCT 10FR LAIN FTSWCH ACTIVATION DISP VALLEYLAB

## (undated) DEVICE — SWAB FBR TIP L16IN PLAS RAYON TIP FOR OB GYN PROCTOSCOPIC

## (undated) DEVICE — CYSTO/BLADDER IRRIGATION SET, REGULATING CLAMP

## (undated) DEVICE — SYSTEM WST MGMT AVETA

## (undated) DEVICE — SYRINGE IRRIG 60ML SFT PLIABLE BLB EZ TO GRP 1 HND USE W/

## (undated) DEVICE — CONTAINER PROTOCOL 480/240ML 10% BUFFERED

## (undated) DEVICE — SYRINGE, LUER LOCK, 10ML: Brand: MEDLINE

## (undated) DEVICE — ELECTRODE ARTHSCP 15X11MM SHFT 11CM MPLR LOOP GRN DISP W/

## (undated) DEVICE — SYSTEM WST MGMT W/ CAP AVETA

## (undated) DEVICE — ELECTRODE PT RET AD L9FT HI MOIST COND ADH HYDRGEL CORDED

## (undated) DEVICE — SUTURE VCRL SZ 0 L36IN ABSRB UD CT-1 L36MM 1/2 CIR TAPR PNT VCP946H